# Patient Record
Sex: FEMALE | Race: WHITE | NOT HISPANIC OR LATINO | URBAN - METROPOLITAN AREA
[De-identification: names, ages, dates, MRNs, and addresses within clinical notes are randomized per-mention and may not be internally consistent; named-entity substitution may affect disease eponyms.]

---

## 2017-01-21 ENCOUNTER — INPATIENT (INPATIENT)
Facility: HOSPITAL | Age: 42
LOS: 1 days | Discharge: HOME | End: 2017-01-23
Attending: INTERNAL MEDICINE | Admitting: INTERNAL MEDICINE
Payer: MEDICARE

## 2017-01-21 DIAGNOSIS — N80.9 ENDOMETRIOSIS, UNSPECIFIED: Chronic | ICD-10-CM

## 2017-01-21 PROCEDURE — 71010: CPT | Mod: 26

## 2017-01-21 PROCEDURE — 99285 EMERGENCY DEPT VISIT HI MDM: CPT

## 2017-01-22 PROCEDURE — 93010 ELECTROCARDIOGRAM REPORT: CPT

## 2017-01-22 PROCEDURE — 70544 MR ANGIOGRAPHY HEAD W/O DYE: CPT | Mod: 26,59

## 2017-01-22 PROCEDURE — 70547 MR ANGIOGRAPHY NECK W/O DYE: CPT | Mod: 26

## 2017-01-22 PROCEDURE — 70450 CT HEAD/BRAIN W/O DYE: CPT | Mod: 26

## 2017-01-22 PROCEDURE — 70551 MRI BRAIN STEM W/O DYE: CPT | Mod: 26

## 2017-01-22 PROCEDURE — 99222 1ST HOSP IP/OBS MODERATE 55: CPT

## 2017-01-23 PROCEDURE — 93306 TTE W/DOPPLER COMPLETE: CPT | Mod: 26

## 2017-01-23 PROCEDURE — 93010 ELECTROCARDIOGRAM REPORT: CPT

## 2017-01-23 PROCEDURE — 99232 SBSQ HOSP IP/OBS MODERATE 35: CPT

## 2017-01-23 PROCEDURE — 95819 EEG AWAKE AND ASLEEP: CPT | Mod: 26

## 2017-01-24 ENCOUNTER — TRANSCRIPTION ENCOUNTER (OUTPATIENT)
Age: 42
End: 2017-01-24

## 2017-01-27 DIAGNOSIS — D69.6 THROMBOCYTOPENIA, UNSPECIFIED: ICD-10-CM

## 2017-01-27 DIAGNOSIS — R20.0 ANESTHESIA OF SKIN: ICD-10-CM
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
## 2017-01-27 DIAGNOSIS — T42.6X5A ADVERSE EFFECT OF OTHER ANTIEPILEPTIC AND SEDATIVE-HYPNOTIC DRUGS, INITIAL ENCOUNTER: ICD-10-CM

## 2017-01-27 DIAGNOSIS — G45.9 TRANSIENT CEREBRAL ISCHEMIC ATTACK, UNSPECIFIED: ICD-10-CM

## 2017-01-27 DIAGNOSIS — G43.109 MIGRAINE WITH AURA, NOT INTRACTABLE, WITHOUT STATUS MIGRAINOSUS: ICD-10-CM

## 2017-01-30 DIAGNOSIS — R20.2 PARESTHESIA OF SKIN: ICD-10-CM

## 2017-01-30 DIAGNOSIS — G93.9 DISORDER OF BRAIN, UNSPECIFIED: ICD-10-CM

## 2017-07-03 ENCOUNTER — TRANSCRIPTION ENCOUNTER (OUTPATIENT)
Age: 42
End: 2017-07-03

## 2017-07-03 ENCOUNTER — EMERGENCY (EMERGENCY)
Facility: HOSPITAL | Age: 42
LOS: 1 days | Discharge: HOME | End: 2017-07-03
Attending: EMERGENCY MEDICINE | Admitting: EMERGENCY MEDICINE
Payer: MEDICARE

## 2017-07-03 VITALS
HEART RATE: 71 BPM | OXYGEN SATURATION: 98 % | DIASTOLIC BLOOD PRESSURE: 78 MMHG | TEMPERATURE: 98 F | RESPIRATION RATE: 16 BRPM | SYSTOLIC BLOOD PRESSURE: 116 MMHG

## 2017-07-03 VITALS
HEIGHT: 65 IN | HEART RATE: 69 BPM | RESPIRATION RATE: 15 BRPM | OXYGEN SATURATION: 100 % | DIASTOLIC BLOOD PRESSURE: 78 MMHG | WEIGHT: 113.98 LBS | SYSTOLIC BLOOD PRESSURE: 109 MMHG | TEMPERATURE: 98 F

## 2017-07-03 DIAGNOSIS — N80.9 ENDOMETRIOSIS, UNSPECIFIED: Chronic | ICD-10-CM

## 2017-07-03 LAB
ALBUMIN SERPL ELPH-MCNC: 4 G/DL — SIGNIFICANT CHANGE UP (ref 3.3–5)
ALP SERPL-CCNC: 47 U/L — SIGNIFICANT CHANGE UP (ref 30–120)
ALT FLD-CCNC: 16 U/L DA — SIGNIFICANT CHANGE UP (ref 10–60)
ANION GAP SERPL CALC-SCNC: 8 MMOL/L — SIGNIFICANT CHANGE UP (ref 5–17)
APPEARANCE UR: CLEAR — SIGNIFICANT CHANGE UP
APTT BLD: 37.1 SEC — SIGNIFICANT CHANGE UP (ref 27.5–37.4)
AST SERPL-CCNC: 22 U/L — SIGNIFICANT CHANGE UP (ref 10–40)
BASOPHILS # BLD AUTO: 0 K/UL — SIGNIFICANT CHANGE UP (ref 0–0.2)
BASOPHILS NFR BLD AUTO: 1 % — SIGNIFICANT CHANGE UP (ref 0–2)
BILIRUB SERPL-MCNC: 0.5 MG/DL — SIGNIFICANT CHANGE UP (ref 0.2–1.2)
BILIRUB UR-MCNC: ABNORMAL
BUN SERPL-MCNC: 17 MG/DL — SIGNIFICANT CHANGE UP (ref 7–23)
CALCIUM SERPL-MCNC: 10 MG/DL — SIGNIFICANT CHANGE UP (ref 8.4–10.5)
CHLORIDE SERPL-SCNC: 103 MMOL/L — SIGNIFICANT CHANGE UP (ref 96–108)
CO2 SERPL-SCNC: 30 MMOL/L — SIGNIFICANT CHANGE UP (ref 22–31)
COLOR SPEC: YELLOW — SIGNIFICANT CHANGE UP
CREAT SERPL-MCNC: 0.68 MG/DL — SIGNIFICANT CHANGE UP (ref 0.5–1.3)
DIFF PNL FLD: ABNORMAL
EOSINOPHIL # BLD AUTO: 0 K/UL — SIGNIFICANT CHANGE UP (ref 0–0.5)
EOSINOPHIL NFR BLD AUTO: 0.5 % — SIGNIFICANT CHANGE UP (ref 0–6)
GLUCOSE SERPL-MCNC: 96 MG/DL — SIGNIFICANT CHANGE UP (ref 70–99)
GLUCOSE UR QL: NEGATIVE MG/DL — SIGNIFICANT CHANGE UP
HCG UR QL: NEGATIVE — SIGNIFICANT CHANGE UP
HCT VFR BLD CALC: 39.3 % — SIGNIFICANT CHANGE UP (ref 34.5–45)
HGB BLD-MCNC: 13.2 G/DL — SIGNIFICANT CHANGE UP (ref 11.5–15.5)
INR BLD: 1.18 RATIO — HIGH (ref 0.88–1.16)
KETONES UR-MCNC: ABNORMAL
LEUKOCYTE ESTERASE UR-ACNC: ABNORMAL
LIDOCAIN IGE QN: 186 U/L — SIGNIFICANT CHANGE UP (ref 73–393)
LYMPHOCYTES # BLD AUTO: 1.6 K/UL — SIGNIFICANT CHANGE UP (ref 1–3.3)
LYMPHOCYTES # BLD AUTO: 35.3 % — SIGNIFICANT CHANGE UP (ref 13–44)
MCHC RBC-ENTMCNC: 29.5 PG — SIGNIFICANT CHANGE UP (ref 27–34)
MCHC RBC-ENTMCNC: 33.6 GM/DL — SIGNIFICANT CHANGE UP (ref 32–36)
MCV RBC AUTO: 87.7 FL — SIGNIFICANT CHANGE UP (ref 80–100)
MONOCYTES # BLD AUTO: 0.5 K/UL — SIGNIFICANT CHANGE UP (ref 0–0.9)
MONOCYTES NFR BLD AUTO: 11.3 % — SIGNIFICANT CHANGE UP (ref 2–14)
NEUTROPHILS # BLD AUTO: 2.3 K/UL — SIGNIFICANT CHANGE UP (ref 1.8–7.4)
NEUTROPHILS NFR BLD AUTO: 51.8 % — SIGNIFICANT CHANGE UP (ref 43–77)
NITRITE UR-MCNC: POSITIVE
PH UR: 6 — SIGNIFICANT CHANGE UP (ref 5–8)
PLATELET # BLD AUTO: 105 K/UL — LOW (ref 150–400)
POTASSIUM SERPL-MCNC: 4.1 MMOL/L — SIGNIFICANT CHANGE UP (ref 3.5–5.3)
POTASSIUM SERPL-SCNC: 4.1 MMOL/L — SIGNIFICANT CHANGE UP (ref 3.5–5.3)
PROT SERPL-MCNC: 7.9 G/DL — SIGNIFICANT CHANGE UP (ref 6–8.3)
PROT UR-MCNC: 30 MG/DL
PROTHROM AB SERPL-ACNC: 12.9 SEC — HIGH (ref 9.8–12.7)
RBC # BLD: 4.49 M/UL — SIGNIFICANT CHANGE UP (ref 3.8–5.2)
RBC # FLD: 12.6 % — SIGNIFICANT CHANGE UP (ref 10.3–14.5)
SODIUM SERPL-SCNC: 141 MMOL/L — SIGNIFICANT CHANGE UP (ref 135–145)
SP GR SPEC: 1.02 — SIGNIFICANT CHANGE UP (ref 1.01–1.02)
UROBILINOGEN FLD QL: 1 MG/DL
WBC # BLD: 4.5 K/UL — SIGNIFICANT CHANGE UP (ref 3.8–10.5)
WBC # FLD AUTO: 4.5 K/UL — SIGNIFICANT CHANGE UP (ref 3.8–10.5)

## 2017-07-03 PROCEDURE — 81025 URINE PREGNANCY TEST: CPT

## 2017-07-03 PROCEDURE — 80053 COMPREHEN METABOLIC PANEL: CPT

## 2017-07-03 PROCEDURE — 99284 EMERGENCY DEPT VISIT MOD MDM: CPT | Mod: 25

## 2017-07-03 PROCEDURE — 85027 COMPLETE CBC AUTOMATED: CPT

## 2017-07-03 PROCEDURE — 99285 EMERGENCY DEPT VISIT HI MDM: CPT

## 2017-07-03 PROCEDURE — 36415 COLL VENOUS BLD VENIPUNCTURE: CPT

## 2017-07-03 PROCEDURE — 74177 CT ABD & PELVIS W/CONTRAST: CPT

## 2017-07-03 PROCEDURE — 85730 THROMBOPLASTIN TIME PARTIAL: CPT

## 2017-07-03 PROCEDURE — 74177 CT ABD & PELVIS W/CONTRAST: CPT | Mod: 26

## 2017-07-03 PROCEDURE — 85610 PROTHROMBIN TIME: CPT

## 2017-07-03 PROCEDURE — 81001 URINALYSIS AUTO W/SCOPE: CPT

## 2017-07-03 PROCEDURE — 83690 ASSAY OF LIPASE: CPT

## 2017-07-03 RX ORDER — AZTREONAM 2 G
1 VIAL (EA) INJECTION
Qty: 20 | Refills: 0
Start: 2017-07-03 | End: 2017-07-13

## 2017-07-03 RX ORDER — SODIUM CHLORIDE 9 MG/ML
3 INJECTION INTRAMUSCULAR; INTRAVENOUS; SUBCUTANEOUS ONCE
Qty: 0 | Refills: 0 | Status: COMPLETED | OUTPATIENT
Start: 2017-07-03 | End: 2017-07-03

## 2017-07-03 RX ORDER — IOHEXOL 300 MG/ML
30 INJECTION, SOLUTION INTRAVENOUS ONCE
Qty: 0 | Refills: 0 | Status: COMPLETED | OUTPATIENT
Start: 2017-07-03 | End: 2017-07-03

## 2017-07-03 RX ADMIN — IOHEXOL 30 MILLILITER(S): 300 INJECTION, SOLUTION INTRAVENOUS at 12:20

## 2017-07-03 RX ADMIN — SODIUM CHLORIDE 3 MILLILITER(S): 9 INJECTION INTRAMUSCULAR; INTRAVENOUS; SUBCUTANEOUS at 12:21

## 2017-07-03 RX ADMIN — Medication 1 TABLET(S): at 16:25

## 2017-07-03 NOTE — ED PROVIDER NOTE - OBJECTIVE STATEMENT
43 y/o F pt with history of ovarian cysts, endometriosis, fibromyalgia, epilepsy presents to the ED c/o intermittent abdominal pain and diarrhea since yesterday. Pt states after the diarrhea began, she also noticed rectal bleeding every time she goes to the bathroom. Pt went to Urgent Care and they sent her to ED. Pt has never had these symptoms before. Denies recent travel, sick contacts, nausea, vomiting, fever. No further complaints at this time.

## 2017-07-03 NOTE — ED ADULT TRIAGE NOTE - CHIEF COMPLAINT QUOTE
42 yr. old female c/o left lower abdominal pain since yesterday and rectal bleeding since yesterday.

## 2019-06-24 PROBLEM — Z01.419 VISIT FOR ROUTINE GYN EXAM: Status: ACTIVE | Noted: 2019-06-24

## 2019-06-25 ENCOUNTER — APPOINTMENT (OUTPATIENT)
Dept: OBGYN | Facility: CLINIC | Age: 44
End: 2019-06-25
Payer: MEDICARE

## 2019-06-25 ENCOUNTER — CLINICAL ADVICE (OUTPATIENT)
Age: 44
End: 2019-06-25

## 2019-06-25 VITALS
HEIGHT: 65.5 IN | SYSTOLIC BLOOD PRESSURE: 115 MMHG | BODY MASS INDEX: 19.16 KG/M2 | DIASTOLIC BLOOD PRESSURE: 70 MMHG | HEART RATE: 79 BPM | WEIGHT: 116.4 LBS

## 2019-06-25 DIAGNOSIS — Z01.419 ENCOUNTER FOR GYNECOLOGICAL EXAMINATION (GENERAL) (ROUTINE) W/OUT ABNORMAL FINDINGS: ICD-10-CM

## 2019-06-25 DIAGNOSIS — Z78.9 OTHER SPECIFIED HEALTH STATUS: ICD-10-CM

## 2019-06-25 DIAGNOSIS — R19.09 OTHER INTRA-ABDOMINAL AND PELVIC SWELLING, MASS AND LUMP: ICD-10-CM

## 2019-06-25 DIAGNOSIS — N95.2 POSTMENOPAUSAL ATROPHIC VAGINITIS: ICD-10-CM

## 2019-06-25 DIAGNOSIS — E28.319 ASYMPTOMATIC PREMATURE MENOPAUSE: ICD-10-CM

## 2019-06-25 PROCEDURE — G0101: CPT

## 2019-06-25 PROCEDURE — 99214 OFFICE O/P EST MOD 30 MIN: CPT | Mod: 25

## 2019-06-25 RX ORDER — AMOXICILLIN AND CLAVULANATE POTASSIUM 500; 125 MG/1; MG/1
500-125 TABLET, FILM COATED ORAL
Qty: 20 | Refills: 0 | Status: DISCONTINUED | COMMUNITY
Start: 2019-06-25 | End: 2019-06-25

## 2019-06-25 NOTE — PHYSICAL EXAM
[Awake] : awake [Alert] : alert [Soft] : soft [Oriented x3] : oriented to person, place, and time [Normal] : uterus [No Bleeding] : there was no active vaginal bleeding [Uterine Adnexae] : were not tender and not enlarged [Acute Distress] : no acute distress [Mass] : no breast mass [Axillary LAD] : no axillary lymphadenopathy [Nipple Discharge] : no nipple discharge [Tender] : non tender [de-identified] : right groin with erythematous firm tender mobile mass 2 x 4 cm [FreeTextEntry4] : narrow introitus

## 2019-06-27 LAB — HPV HIGH+LOW RISK DNA PNL CVX: NOT DETECTED

## 2019-07-02 LAB — CYTOLOGY CVX/VAG DOC THIN PREP: NORMAL

## 2019-07-06 ENCOUNTER — APPOINTMENT (OUTPATIENT)
Dept: MRI IMAGING | Facility: CLINIC | Age: 44
End: 2019-07-06

## 2019-07-06 ENCOUNTER — OUTPATIENT (OUTPATIENT)
Dept: OUTPATIENT SERVICES | Facility: HOSPITAL | Age: 44
LOS: 1 days | End: 2019-07-06
Payer: MEDICARE

## 2019-07-06 DIAGNOSIS — Z00.8 ENCOUNTER FOR OTHER GENERAL EXAMINATION: ICD-10-CM

## 2019-07-06 DIAGNOSIS — N80.9 ENDOMETRIOSIS, UNSPECIFIED: Chronic | ICD-10-CM

## 2019-07-06 PROCEDURE — 72197 MRI PELVIS W/O & W/DYE: CPT

## 2019-07-06 PROCEDURE — 72197 MRI PELVIS W/O & W/DYE: CPT | Mod: 26

## 2019-07-06 PROCEDURE — A9585: CPT

## 2019-07-16 ENCOUNTER — APPOINTMENT (OUTPATIENT)
Dept: GYNECOLOGIC ONCOLOGY | Facility: CLINIC | Age: 44
End: 2019-07-16
Payer: MEDICARE

## 2019-07-16 VITALS
BODY MASS INDEX: 18.99 KG/M2 | WEIGHT: 115.38 LBS | HEIGHT: 65.5 IN | HEART RATE: 67 BPM | SYSTOLIC BLOOD PRESSURE: 111 MMHG | DIASTOLIC BLOOD PRESSURE: 73 MMHG

## 2019-07-16 DIAGNOSIS — N94.89 OTHER SPECIFIED CONDITIONS ASSOCIATED WITH FEMALE GENITAL ORGANS AND MENSTRUAL CYCLE: ICD-10-CM

## 2019-07-16 PROCEDURE — 99203 OFFICE O/P NEW LOW 30 MIN: CPT

## 2019-07-16 NOTE — OB HISTORY
[Total Preg ___] : : [unfilled] [Living ___] : [unfilled] (living) [ ___] : [unfilled]  section delivery(s) [Menarche Age ____] : age at menarche was [unfilled] [Menopause  Age ____] : menopause occurred at age [unfilled]

## 2019-08-11 ENCOUNTER — EMERGENCY (EMERGENCY)
Facility: HOSPITAL | Age: 44
LOS: 0 days | Discharge: HOME | End: 2019-08-11
Attending: EMERGENCY MEDICINE
Payer: MEDICARE

## 2019-08-11 VITALS — WEIGHT: 134.92 LBS | HEIGHT: 63 IN

## 2019-08-11 VITALS
RESPIRATION RATE: 19 BRPM | TEMPERATURE: 98 F | OXYGEN SATURATION: 100 % | SYSTOLIC BLOOD PRESSURE: 134 MMHG | HEART RATE: 89 BPM | DIASTOLIC BLOOD PRESSURE: 81 MMHG

## 2019-08-11 DIAGNOSIS — N80.9 ENDOMETRIOSIS, UNSPECIFIED: Chronic | ICD-10-CM

## 2019-08-11 DIAGNOSIS — G89.29 OTHER CHRONIC PAIN: ICD-10-CM

## 2019-08-11 DIAGNOSIS — Z88.1 ALLERGY STATUS TO OTHER ANTIBIOTIC AGENTS STATUS: ICD-10-CM

## 2019-08-11 DIAGNOSIS — F12.920 CANNABIS USE, UNSPECIFIED WITH INTOXICATION, UNCOMPLICATED: ICD-10-CM

## 2019-08-11 DIAGNOSIS — R41.0 DISORIENTATION, UNSPECIFIED: ICD-10-CM

## 2019-08-11 DIAGNOSIS — F41.0 PANIC DISORDER [EPISODIC PAROXYSMAL ANXIETY]: ICD-10-CM

## 2019-08-11 DIAGNOSIS — M06.9 RHEUMATOID ARTHRITIS, UNSPECIFIED: ICD-10-CM

## 2019-08-11 DIAGNOSIS — G40.909 EPILEPSY, UNSPECIFIED, NOT INTRACTABLE, WITHOUT STATUS EPILEPTICUS: ICD-10-CM

## 2019-08-11 DIAGNOSIS — R11.10 VOMITING, UNSPECIFIED: ICD-10-CM

## 2019-08-11 LAB
ALBUMIN SERPL ELPH-MCNC: 3.7 G/DL — SIGNIFICANT CHANGE UP (ref 3.3–5)
ALP SERPL-CCNC: 45 U/L — SIGNIFICANT CHANGE UP (ref 40–120)
ALT FLD-CCNC: 24 U/L — SIGNIFICANT CHANGE UP (ref 12–78)
ANION GAP SERPL CALC-SCNC: 6 MMOL/L — SIGNIFICANT CHANGE UP (ref 5–17)
AST SERPL-CCNC: 42 U/L — HIGH (ref 15–37)
BASOPHILS # BLD AUTO: 0.03 K/UL — SIGNIFICANT CHANGE UP (ref 0–0.2)
BASOPHILS NFR BLD AUTO: 0.4 % — SIGNIFICANT CHANGE UP (ref 0–2)
BILIRUB SERPL-MCNC: 0.4 MG/DL — SIGNIFICANT CHANGE UP (ref 0.2–1.2)
BUN SERPL-MCNC: 14 MG/DL — SIGNIFICANT CHANGE UP (ref 7–23)
CALCIUM SERPL-MCNC: 10 MG/DL — SIGNIFICANT CHANGE UP (ref 8.5–10.1)
CHLORIDE SERPL-SCNC: 101 MMOL/L — SIGNIFICANT CHANGE UP (ref 96–108)
CK SERPL-CCNC: 66 U/L — SIGNIFICANT CHANGE UP (ref 26–192)
CO2 SERPL-SCNC: 29 MMOL/L — SIGNIFICANT CHANGE UP (ref 22–31)
CREAT SERPL-MCNC: 0.76 MG/DL — SIGNIFICANT CHANGE UP (ref 0.5–1.3)
EOSINOPHIL # BLD AUTO: 0.02 K/UL — SIGNIFICANT CHANGE UP (ref 0–0.5)
EOSINOPHIL NFR BLD AUTO: 0.3 % — SIGNIFICANT CHANGE UP (ref 0–6)
GLUCOSE SERPL-MCNC: 92 MG/DL — SIGNIFICANT CHANGE UP (ref 70–99)
HCT VFR BLD CALC: 39.6 % — SIGNIFICANT CHANGE UP (ref 34.5–45)
HGB BLD-MCNC: 13.4 G/DL — SIGNIFICANT CHANGE UP (ref 11.5–15.5)
IMM GRANULOCYTES NFR BLD AUTO: 0.4 % — SIGNIFICANT CHANGE UP (ref 0–1.5)
LYMPHOCYTES # BLD AUTO: 3.45 K/UL — HIGH (ref 1–3.3)
LYMPHOCYTES # BLD AUTO: 46.9 % — HIGH (ref 13–44)
MCHC RBC-ENTMCNC: 30.7 PG — SIGNIFICANT CHANGE UP (ref 27–34)
MCHC RBC-ENTMCNC: 33.8 GM/DL — SIGNIFICANT CHANGE UP (ref 32–36)
MCV RBC AUTO: 90.8 FL — SIGNIFICANT CHANGE UP (ref 80–100)
MONOCYTES # BLD AUTO: 0.88 K/UL — SIGNIFICANT CHANGE UP (ref 0–0.9)
MONOCYTES NFR BLD AUTO: 12 % — SIGNIFICANT CHANGE UP (ref 2–14)
NEUTROPHILS # BLD AUTO: 2.94 K/UL — SIGNIFICANT CHANGE UP (ref 1.8–7.4)
NEUTROPHILS NFR BLD AUTO: 40 % — LOW (ref 43–77)
PLATELET # BLD AUTO: 122 K/UL — LOW (ref 150–400)
POTASSIUM SERPL-MCNC: 3.5 MMOL/L — SIGNIFICANT CHANGE UP (ref 3.5–5.3)
POTASSIUM SERPL-SCNC: 3.5 MMOL/L — SIGNIFICANT CHANGE UP (ref 3.5–5.3)
PROT SERPL-MCNC: 7.9 GM/DL — SIGNIFICANT CHANGE UP (ref 6–8.3)
RBC # BLD: 4.36 M/UL — SIGNIFICANT CHANGE UP (ref 3.8–5.2)
RBC # FLD: 13.6 % — SIGNIFICANT CHANGE UP (ref 10.3–14.5)
SODIUM SERPL-SCNC: 136 MMOL/L — SIGNIFICANT CHANGE UP (ref 135–145)
TROPONIN I SERPL-MCNC: <0.015 NG/ML — SIGNIFICANT CHANGE UP (ref 0.01–0.04)
VALPROATE SERPL-MCNC: 102 UG/ML — HIGH (ref 50–100)
WBC # BLD: 7.35 K/UL — SIGNIFICANT CHANGE UP (ref 3.8–10.5)
WBC # FLD AUTO: 7.35 K/UL — SIGNIFICANT CHANGE UP (ref 3.8–10.5)

## 2019-08-11 PROCEDURE — 99284 EMERGENCY DEPT VISIT MOD MDM: CPT

## 2019-08-11 PROCEDURE — 36415 COLL VENOUS BLD VENIPUNCTURE: CPT

## 2019-08-11 PROCEDURE — 93005 ELECTROCARDIOGRAM TRACING: CPT

## 2019-08-11 PROCEDURE — 96375 TX/PRO/DX INJ NEW DRUG ADDON: CPT

## 2019-08-11 PROCEDURE — 82550 ASSAY OF CK (CPK): CPT

## 2019-08-11 PROCEDURE — 93010 ELECTROCARDIOGRAM REPORT: CPT

## 2019-08-11 PROCEDURE — 80164 ASSAY DIPROPYLACETIC ACD TOT: CPT

## 2019-08-11 PROCEDURE — 80053 COMPREHEN METABOLIC PANEL: CPT

## 2019-08-11 PROCEDURE — 85025 COMPLETE CBC W/AUTO DIFF WBC: CPT

## 2019-08-11 PROCEDURE — 99285 EMERGENCY DEPT VISIT HI MDM: CPT | Mod: 25

## 2019-08-11 PROCEDURE — 84484 ASSAY OF TROPONIN QUANT: CPT

## 2019-08-11 PROCEDURE — 96374 THER/PROPH/DIAG INJ IV PUSH: CPT

## 2019-08-11 RX ORDER — SODIUM CHLORIDE 9 MG/ML
1000 INJECTION INTRAMUSCULAR; INTRAVENOUS; SUBCUTANEOUS ONCE
Refills: 0 | Status: COMPLETED | OUTPATIENT
Start: 2019-08-11 | End: 2019-08-11

## 2019-08-11 RX ORDER — ONDANSETRON 8 MG/1
4 TABLET, FILM COATED ORAL ONCE
Refills: 0 | Status: COMPLETED | OUTPATIENT
Start: 2019-08-11 | End: 2019-08-11

## 2019-08-11 RX ORDER — DIVALPROEX SODIUM 500 MG/1
750 TABLET, DELAYED RELEASE ORAL ONCE
Refills: 0 | Status: COMPLETED | OUTPATIENT
Start: 2019-08-11 | End: 2019-08-11

## 2019-08-11 RX ADMIN — Medication 1 MILLIGRAM(S): at 00:40

## 2019-08-11 RX ADMIN — SODIUM CHLORIDE 1000 MILLILITER(S): 9 INJECTION INTRAMUSCULAR; INTRAVENOUS; SUBCUTANEOUS at 02:24

## 2019-08-11 RX ADMIN — DIVALPROEX SODIUM 750 MILLIGRAM(S): 500 TABLET, DELAYED RELEASE ORAL at 02:34

## 2019-08-11 RX ADMIN — SODIUM CHLORIDE 1000 MILLILITER(S): 9 INJECTION INTRAMUSCULAR; INTRAVENOUS; SUBCUTANEOUS at 00:41

## 2019-08-11 RX ADMIN — ONDANSETRON 4 MILLIGRAM(S): 8 TABLET, FILM COATED ORAL at 00:40

## 2019-08-11 NOTE — ED ADULT TRIAGE NOTE - CHIEF COMPLAINT QUOTE
pt presents to ED s/p seizure or panic attack. pt c/o throat closing, difficulty breathing. hx epilepsy

## 2019-08-11 NOTE — ED ADULT NURSE NOTE - MUSCULOSKELETAL WDL
Full range of motion of upper and lower extremities, no joint tenderness/swelling. Full range of motion of upper and lower extremities, no joint tenderness/swelling., pt with tremors

## 2019-08-11 NOTE — ED PROVIDER NOTE - OBJECTIVE STATEMENT
Pt. is a 43 yo F with a hx of RA, anxiety, mood disorder, scoliosis, seizures, endometriosis BIB  for palpitations, chest pain, panic attack after taking CBD for the first time recommended for her chronic pain.  Pt. states she cannot sit still and feels shaky and dizzy.  Pt. c/o chest tightness.  Pt. came to the ED for evaluation.  Pt. vomitted and does not feel well and did not take a dose of her depakote yet tonight.

## 2019-08-11 NOTE — ED ADULT NURSE NOTE - NSIMPLEMENTINTERV_GEN_ALL_ED
Implemented All Fall Risk Interventions:  [place] to call system. Call bell, personal items and telephone within reach. Instruct patient to call for assistance. Room bathroom lighting operational. Non-slip footwear when patient is off stretcher. Physically safe environment: no spills, clutter or unnecessary equipment. Stretcher in lowest position, wheels locked, appropriate side rails in place. Provide visual cue, wrist band, yellow gown, etc. Monitor gait and stability. Monitor for mental status changes and reorient to person, place, and time. Review medications for side effects contributing to fall risk. Reinforce activity limits and safety measures with patient and family.

## 2019-08-11 NOTE — ED PROVIDER NOTE - CONSTITUTIONAL, MLM
normal... Well appearing, well nourished, awake, alert, oriented to person, place, time/situation ; anxious and hyperventilating.

## 2019-08-11 NOTE — ED PROVIDER NOTE - PROGRESS NOTE DETAILS
Vitals improved and patient feeling better.  Wants to go home and go to sleep.  Will be discharged shortly with .

## 2019-08-11 NOTE — ED PROVIDER NOTE - CLINICAL SUMMARY MEDICAL DECISION MAKING FREE TEXT BOX
CBD intoxication and side effects from medication with panic attack.  labs, ekg normal and IVF and meds helped with symptoms.

## 2019-08-11 NOTE — ED ADULT NURSE NOTE - NSFALLRSKASSISTTYPE_ED_ALL_ED
Indication: Forehead injury following fall.



Multiple contiguous axial images obtained through the cervical spine.

Sagittal and coronal reformatted images obtained.



Comparison: May 29, 2009.



Several images slightly degraded by motion artifact even with repeated CT.  No

acute fracture, suspicious bone lesions, or spinal canal stenosis.  Stable

C3-C7 degenerative anterior endplate spurring.  Sagittal and coronal

reformatted images again demonstrates cervical lordotic straightening,

positional versus paraspinal spasm.  Vertebral body heights and disc spaces

maintained.  No acute compression fracture, subluxation, or jumped facet.

Normal appearing craniocervical junction.



Visualized noncontrasted soft tissues unremarkable.  Minimal right apical

fibrosis/scarring and dependent atelectasis.



Impression:

1.  Motion artifact.

2.  Stable multilevel degenerative endplate spurring and cervical lordotic

stranding, positional versus paraspinal spasm.

3.  Negative acute fracture/subluxation.



Comment: Preliminary interpretation was made by [facility].  No discrepancy.



CTDI 59.95
Indication: Forehead injury following fall.



Multiple contiguous axial images obtained through the head without contrast.



Comparison: April 14, 2017.



Study slightly degraded by motion artifact even with repeat CT.  Stable

age-appropriate global atrophy, minimal periventricular degenerative

micro-ischemia, and prominent ventricular system.  No acute intracranial

hemorrhage, abnormal extra-axial fluid question, or mass effect.  Fourth

ventricle is midline.  Bony calvarium intact.  Visualized paranasal sinuses

and mastoid air cells are clear.



Impression:

1.  Motion artifact.

2.  Stable nonacute senile brain with prominent ventricular system.



Comment: Preliminary interpretation was made by [facility].  No discrepancy.



CTDI 59.95
Walking/Toileting/Standing

## 2019-08-11 NOTE — ED ADULT NURSE NOTE - OBJECTIVE STATEMENT
pt reports smoking CBD tonight that a friend gave to her, pt with visual tremors on exam, pt reports chest pain, SOB, difficulty breathing, pt reports feeling extremely anxious and overall feeling bad, pt tachycardic on assessment, MD aware of vital signs, pt denies SI/Hi at this time

## 2019-09-03 ENCOUNTER — APPOINTMENT (OUTPATIENT)
Dept: GYNECOLOGIC ONCOLOGY | Facility: CLINIC | Age: 44
End: 2019-09-03
Payer: MEDICARE

## 2019-09-03 VITALS
WEIGHT: 116.13 LBS | BODY MASS INDEX: 19.12 KG/M2 | HEIGHT: 65.5 IN | DIASTOLIC BLOOD PRESSURE: 66 MMHG | SYSTOLIC BLOOD PRESSURE: 100 MMHG | HEART RATE: 68 BPM

## 2019-09-03 DIAGNOSIS — N90.89 OTHER SPECIFIED NONINFLAMMATORY DISORDERS OF VULVA AND PERINEUM: ICD-10-CM

## 2019-09-03 PROCEDURE — 99212 OFFICE O/P EST SF 10 MIN: CPT

## 2019-09-03 RX ORDER — DIVALPROEX SODIUM 250 MG/1
250 TABLET, DELAYED RELEASE ORAL
Qty: 270 | Refills: 0 | Status: ACTIVE | COMMUNITY
Start: 2019-06-20

## 2019-09-03 RX ORDER — DIVALPROEX SODIUM 250 MG/1
250 TABLET, EXTENDED RELEASE ORAL
Qty: 270 | Refills: 0 | Status: ACTIVE | COMMUNITY
Start: 2019-06-07

## 2019-09-30 ENCOUNTER — APPOINTMENT (OUTPATIENT)
Dept: PLASTIC SURGERY | Facility: CLINIC | Age: 44
End: 2019-09-30
Payer: MEDICARE

## 2019-09-30 DIAGNOSIS — T85.44XA CAPSULAR CONTRACTURE OF BREAST IMPLANT, INITIAL ENCOUNTER: ICD-10-CM

## 2019-09-30 PROCEDURE — 99204 OFFICE O/P NEW MOD 45 MIN: CPT

## 2019-10-02 PROBLEM — T85.44XA CAPSULAR CONTRACTURE OF BREAST IMPLANT, INITIAL ENCOUNTER: Status: ACTIVE | Noted: 2019-10-02

## 2019-10-02 NOTE — CONSULT LETTER
[Dear  ___] : Dear  [unfilled], [Consult Letter:] : I had the pleasure of evaluating your patient, [unfilled]. [Please see my note below.] : Please see my note below. [Sincerely,] : Sincerely, [DrChino  ___] : Dr. LLOYD [FreeTextEntry3] : Dani Santo MD, MS

## 2019-10-02 NOTE — ASSESSMENT
[FreeTextEntry1] : Interval resolution of right vulvar lesion from prior visit to Dr. Parisi. Patient was asked to return for re-evaluation of area should the lesion recur. No indication for intervention at this time. Would plan for punch biopsy at that time.\par \par We will need the ultrasound report from her prior ultrasound. With regard to her capsular contracture, will review her history and images to determine the best course of action. Given her paucity of soft tissue, multiple prior procedures, and capsular contracture, she is at elevated risk for a suboptimal aesthetic result in the future.\par \par \par

## 2019-10-02 NOTE — REVIEW OF SYSTEMS
[Arthralgias] : arthralgias [As Noted in HPI] : as noted in HPI [Fever] : no fever [Chills] : no chills [Loss Of Hearing] : no hearing loss [Shortness Of Breath] : no shortness of breath [Anxiety] : no anxiety [Depression] : no depression [FreeTextEntry5] : hx VSD [FreeTextEntry7] : che IBD [FreeTextEntry8] : frequent urination [de-identified] : last seizure 12 years ago [de-identified] : pt states she is undergoing menopause

## 2019-10-02 NOTE — HISTORY OF PRESENT ILLNESS
[FreeTextEntry1] : 45 y/o woman with a history of painful red lesions on the right vulva. She states that, when they appear, there is a chain of multiple confluent lesions. She is interested in having the area excised so they do not recur. She states that they never occur on the left side. She not certain if they represent hidradenitis or another problem. She is not currently sexually active and is not certain if she plans to be. She complains of pain with receptive vaginal intercourse. She is uncertain if she is capable of orgasm. She has had one child by .\par \par She also complains of right > left breast pain and deformity of her breast implants. She complains of a lack of sensation on the right breast. She states that she had prior imaging of the right side that demonstrated a "torn muscle." She had saline breast implants placed when she was 19 years old. These were replaced with silicone implants when she was in her 30s. She states she has had prior breast MRIs but is not certain when the last one was.\par \par PMH: epilepsy, rheumatoid arthritis (based on blood work, per pt), fibromyalgia, nystagmus\par PSH: breast augmentation, eye surgery, laparoscopy for endometriosis,  x 1\par All: erythromycin (uncertain of reaction, told by mother it happened when she was young)\par Meds: depakote\par Soc: denies nicotine/tobacco use, drinks 1-7 alcoholic drinks per week, denies recreational drug use \par Owns a spa, specializes in waxing.\par FH: Her son has an autism spectrum disorder and a g-tube.

## 2019-10-02 NOTE — PHYSICAL EXAM
04/01/18 1901   Provider Notification   Provider Name/Title Lauren Alvarez CNM   Method of Notification Electronic Page   Request Evaluate in Person   Notification Reason Patient Arrived   Pt here for r/o SROM. Amnisure sent. [id number]. No contractions   [de-identified] : NAD  [de-identified] : NC/AT [de-identified] : disconjugate gaze [de-identified] : hearing grossly normal [de-identified] : normal respiratory effort [de-identified] : scoliosis present, right shoulder higher than left, some appearance of pectus excavatum [de-identified] : prominent implants, some bottoming-out on left with tethering of inframammary scar, grade 3-4 capsular contracture on right, grade 1 on left. No palpable dominant masses, no skin changes, no axillary lymphadenopathy. Thin soft tissues. SN->N 18.5 cm B/L, width 14 cm on left, 13 cm on right, N->IMF 9 cm on left, 8.5 cm on right. [de-identified] : t [de-identified] : Vulva and mons without visible lesions. right labus major with 1 cm faded transverse scar. No palpable masses, no other skin changes. external inspection of midline vulvar structures reveals no abnormalities. [de-identified] : no significant visible lesions [de-identified] : normal affect, appropriate

## 2020-01-31 ENCOUNTER — APPOINTMENT (OUTPATIENT)
Dept: GYNECOLOGIC ONCOLOGY | Facility: CLINIC | Age: 45
End: 2020-01-31

## 2020-04-26 PROBLEM — N94.89 LABIAL SWELLING: Status: ACTIVE | Noted: 2019-06-25

## 2020-04-26 NOTE — LETTER BODY
Pt informed.    [Dear  ___] : Dear  [unfilled], [I had the pleasure of evaluating your patient, [unfilled] for ___] : I had the pleasure of evaluating your patient, [unfilled] for [unfilled]. [Attached please find my note.] : Attached please find my note.

## 2020-04-26 NOTE — DISCUSSION/SUMMARY
[Reviewed Clinical Lab Test(s)] : Results of clinical tests were reviewed. [Reviewed Radiology Report(s)] : Radiology reports were reviewed. [Discuss Alternatives/Risks/Benefits w/Patient] : All alternatives, risks, and benefits were discussed with the patient/family and all questions were answered.  Patient expressed good understanding and appreciates the importance of follow up as recommended. [Reviewed Radiology Film/Image(s)] : Images from radiology studies were reviewed and examined. [FreeTextEntry1] : \par   I sat with the patient to review her clinical history and imaging findings consistent with benign intermittent vulvar cellulitis.  Uncertain etiology of periodic re-infection, but documented improvement after antibiotic course & sitz bath.  May warrant future excision if becomes a persistent recurrent issue; I explained that soft tissue laxity at the right vulva is limited and would require plastics for closure of the defect.\par \par Return in six months for interval vulvar examination, return sooner p.r.n.

## 2020-04-26 NOTE — PHYSICAL EXAM
[Normal] : Bimanual Exam: Normal [de-identified] : compared to exam on 6/25, marked improvement - no erythema & slight vertical linear residual induration

## 2020-04-26 NOTE — CHIEF COMPLAINT
[FreeTextEntry1] : Referred by Dr. Scott for the evaluation and management of right vulva/groin mass

## 2020-04-26 NOTE — HISTORY OF PRESENT ILLNESS
[FreeTextEntry1] : 43 yo female G 1  P 1  is being referred by Dr. Scott for the evaluation and management of vulva/ groin mass\par \par Mass started 6 yrs ago. Hospitalized at [facility] 5 yrs ago; warranted antibiotic course. Unable to walk due to the pain. Currently describes mild pain.\par \par 6/25/19 Exam reveals a 3-4 cm (AP) x 2 cm (trans ) region of erythema and central firm induration of the lateral central right labia majora extending to the groin. **note in Allscript 6/25/19 **  Now status post completion of Keflex course.\par \par 7/6/19 MRI pelvis - Uterus measures 3.6 x 1.3 x 2.1 cm, with no myometrial mass. Lining is 2 mm. Atrophic ovaries. 1.4 cm cutaneous enhancing focus of the right labia majora is non specific. No right inguinal collection or mass.\par \par Seizure disorder- last seizure 2007

## 2021-04-20 ENCOUNTER — EMERGENCY (EMERGENCY)
Facility: HOSPITAL | Age: 46
LOS: 1 days | Discharge: HOME | End: 2021-04-20
Attending: EMERGENCY MEDICINE | Admitting: EMERGENCY MEDICINE
Payer: MEDICARE

## 2021-04-20 VITALS
TEMPERATURE: 100 F | SYSTOLIC BLOOD PRESSURE: 112 MMHG | RESPIRATION RATE: 16 BRPM | DIASTOLIC BLOOD PRESSURE: 72 MMHG | HEART RATE: 84 BPM | OXYGEN SATURATION: 99 %

## 2021-04-20 VITALS
DIASTOLIC BLOOD PRESSURE: 67 MMHG | SYSTOLIC BLOOD PRESSURE: 118 MMHG | TEMPERATURE: 98 F | OXYGEN SATURATION: 100 % | HEART RATE: 80 BPM | WEIGHT: 117.07 LBS | RESPIRATION RATE: 16 BRPM | HEIGHT: 63 IN

## 2021-04-20 DIAGNOSIS — N80.9 ENDOMETRIOSIS, UNSPECIFIED: Chronic | ICD-10-CM

## 2021-04-20 LAB
ALBUMIN SERPL ELPH-MCNC: 3.9 G/DL — SIGNIFICANT CHANGE UP (ref 3.3–5)
ALP SERPL-CCNC: 61 U/L — SIGNIFICANT CHANGE UP (ref 40–120)
ALT FLD-CCNC: 26 U/L — SIGNIFICANT CHANGE UP (ref 12–78)
ANION GAP SERPL CALC-SCNC: 5 MMOL/L — SIGNIFICANT CHANGE UP (ref 5–17)
APPEARANCE UR: CLEAR — SIGNIFICANT CHANGE UP
APTT BLD: 36.6 SEC — HIGH (ref 27.5–35.5)
AST SERPL-CCNC: 29 U/L — SIGNIFICANT CHANGE UP (ref 15–37)
BASOPHILS # BLD AUTO: 0.05 K/UL — SIGNIFICANT CHANGE UP (ref 0–0.2)
BASOPHILS NFR BLD AUTO: 1 % — SIGNIFICANT CHANGE UP (ref 0–2)
BILIRUB SERPL-MCNC: 0.4 MG/DL — SIGNIFICANT CHANGE UP (ref 0.2–1.2)
BILIRUB UR-MCNC: NEGATIVE — SIGNIFICANT CHANGE UP
BUN SERPL-MCNC: 12 MG/DL — SIGNIFICANT CHANGE UP (ref 7–23)
CALCIUM SERPL-MCNC: 10.1 MG/DL — SIGNIFICANT CHANGE UP (ref 8.5–10.1)
CHLORIDE SERPL-SCNC: 103 MMOL/L — SIGNIFICANT CHANGE UP (ref 96–108)
CO2 SERPL-SCNC: 29 MMOL/L — SIGNIFICANT CHANGE UP (ref 22–31)
COLOR SPEC: YELLOW — SIGNIFICANT CHANGE UP
CREAT SERPL-MCNC: 0.57 MG/DL — SIGNIFICANT CHANGE UP (ref 0.5–1.3)
DIFF PNL FLD: NEGATIVE — SIGNIFICANT CHANGE UP
EOSINOPHIL # BLD AUTO: 0.05 K/UL — SIGNIFICANT CHANGE UP (ref 0–0.5)
EOSINOPHIL NFR BLD AUTO: 1 % — SIGNIFICANT CHANGE UP (ref 0–6)
GLUCOSE SERPL-MCNC: 87 MG/DL — SIGNIFICANT CHANGE UP (ref 70–99)
GLUCOSE UR QL: NEGATIVE — SIGNIFICANT CHANGE UP
HCG UR QL: NEGATIVE — SIGNIFICANT CHANGE UP
HCT VFR BLD CALC: 43.8 % — SIGNIFICANT CHANGE UP (ref 34.5–45)
HGB BLD-MCNC: 14.5 G/DL — SIGNIFICANT CHANGE UP (ref 11.5–15.5)
INR BLD: 1.06 RATIO — SIGNIFICANT CHANGE UP (ref 0.88–1.16)
KETONES UR-MCNC: NEGATIVE — SIGNIFICANT CHANGE UP
LEUKOCYTE ESTERASE UR-ACNC: ABNORMAL
LIDOCAIN IGE QN: 162 U/L — SIGNIFICANT CHANGE UP (ref 73–393)
LYMPHOCYTES # BLD AUTO: 1.7 K/UL — SIGNIFICANT CHANGE UP (ref 1–3.3)
LYMPHOCYTES # BLD AUTO: 35 % — SIGNIFICANT CHANGE UP (ref 13–44)
MCHC RBC-ENTMCNC: 29.8 PG — SIGNIFICANT CHANGE UP (ref 27–34)
MCHC RBC-ENTMCNC: 33.1 GM/DL — SIGNIFICANT CHANGE UP (ref 32–36)
MCV RBC AUTO: 89.9 FL — SIGNIFICANT CHANGE UP (ref 80–100)
MONOCYTES # BLD AUTO: 0.63 K/UL — SIGNIFICANT CHANGE UP (ref 0–0.9)
MONOCYTES NFR BLD AUTO: 13 % — SIGNIFICANT CHANGE UP (ref 2–14)
NEUTROPHILS # BLD AUTO: 2.43 K/UL — SIGNIFICANT CHANGE UP (ref 1.8–7.4)
NEUTROPHILS NFR BLD AUTO: 50 % — SIGNIFICANT CHANGE UP (ref 43–77)
NITRITE UR-MCNC: NEGATIVE — SIGNIFICANT CHANGE UP
NRBC # BLD: SIGNIFICANT CHANGE UP /100 WBCS (ref 0–0)
PH UR: 8 — SIGNIFICANT CHANGE UP (ref 5–8)
PLATELET # BLD AUTO: 123 K/UL — LOW (ref 150–400)
POTASSIUM SERPL-MCNC: 4.1 MMOL/L — SIGNIFICANT CHANGE UP (ref 3.5–5.3)
POTASSIUM SERPL-SCNC: 4.1 MMOL/L — SIGNIFICANT CHANGE UP (ref 3.5–5.3)
PROT SERPL-MCNC: 9.1 G/DL — HIGH (ref 6–8.3)
PROT UR-MCNC: NEGATIVE — SIGNIFICANT CHANGE UP
PROTHROM AB SERPL-ACNC: 12.4 SEC — SIGNIFICANT CHANGE UP (ref 10.6–13.6)
RBC # BLD: 4.87 M/UL — SIGNIFICANT CHANGE UP (ref 3.8–5.2)
RBC # FLD: 13.5 % — SIGNIFICANT CHANGE UP (ref 10.3–14.5)
SARS-COV-2 RNA SPEC QL NAA+PROBE: SIGNIFICANT CHANGE UP
SODIUM SERPL-SCNC: 137 MMOL/L — SIGNIFICANT CHANGE UP (ref 135–145)
SP GR SPEC: 1.01 — SIGNIFICANT CHANGE UP (ref 1.01–1.02)
UROBILINOGEN FLD QL: NEGATIVE — SIGNIFICANT CHANGE UP
WBC # BLD: 4.85 K/UL — SIGNIFICANT CHANGE UP (ref 3.8–10.5)
WBC # FLD AUTO: 4.85 K/UL — SIGNIFICANT CHANGE UP (ref 3.8–10.5)

## 2021-04-20 PROCEDURE — 74177 CT ABD & PELVIS W/CONTRAST: CPT | Mod: 26,ME

## 2021-04-20 PROCEDURE — 81001 URINALYSIS AUTO W/SCOPE: CPT

## 2021-04-20 PROCEDURE — 85025 COMPLETE CBC W/AUTO DIFF WBC: CPT

## 2021-04-20 PROCEDURE — 99285 EMERGENCY DEPT VISIT HI MDM: CPT | Mod: CS,GC

## 2021-04-20 PROCEDURE — 96374 THER/PROPH/DIAG INJ IV PUSH: CPT | Mod: XU

## 2021-04-20 PROCEDURE — 36415 COLL VENOUS BLD VENIPUNCTURE: CPT

## 2021-04-20 PROCEDURE — 76705 ECHO EXAM OF ABDOMEN: CPT | Mod: 26

## 2021-04-20 PROCEDURE — 93010 ELECTROCARDIOGRAM REPORT: CPT

## 2021-04-20 PROCEDURE — 93005 ELECTROCARDIOGRAM TRACING: CPT

## 2021-04-20 PROCEDURE — 85730 THROMBOPLASTIN TIME PARTIAL: CPT

## 2021-04-20 PROCEDURE — 87635 SARS-COV-2 COVID-19 AMP PRB: CPT

## 2021-04-20 PROCEDURE — 85610 PROTHROMBIN TIME: CPT

## 2021-04-20 PROCEDURE — 76705 ECHO EXAM OF ABDOMEN: CPT

## 2021-04-20 PROCEDURE — 81025 URINE PREGNANCY TEST: CPT

## 2021-04-20 PROCEDURE — 83690 ASSAY OF LIPASE: CPT

## 2021-04-20 PROCEDURE — G1004: CPT

## 2021-04-20 PROCEDURE — 74177 CT ABD & PELVIS W/CONTRAST: CPT

## 2021-04-20 PROCEDURE — 99283 EMERGENCY DEPT VISIT LOW MDM: CPT

## 2021-04-20 PROCEDURE — 99284 EMERGENCY DEPT VISIT MOD MDM: CPT | Mod: 25

## 2021-04-20 PROCEDURE — 80053 COMPREHEN METABOLIC PANEL: CPT

## 2021-04-20 RX ORDER — KETOROLAC TROMETHAMINE 30 MG/ML
30 SYRINGE (ML) INJECTION ONCE
Refills: 0 | Status: DISCONTINUED | OUTPATIENT
Start: 2021-04-20 | End: 2021-04-20

## 2021-04-20 RX ORDER — METRONIDAZOLE 500 MG
1 TABLET ORAL
Qty: 21 | Refills: 0
Start: 2021-04-20 | End: 2021-04-26

## 2021-04-20 RX ORDER — SODIUM CHLORIDE 9 MG/ML
1000 INJECTION INTRAMUSCULAR; INTRAVENOUS; SUBCUTANEOUS ONCE
Refills: 0 | Status: COMPLETED | OUTPATIENT
Start: 2021-04-20 | End: 2021-04-20

## 2021-04-20 RX ORDER — ONDANSETRON 8 MG/1
4 TABLET, FILM COATED ORAL ONCE
Refills: 0 | Status: COMPLETED | OUTPATIENT
Start: 2021-04-20 | End: 2021-04-20

## 2021-04-20 RX ADMIN — SODIUM CHLORIDE 1000 MILLILITER(S): 9 INJECTION INTRAMUSCULAR; INTRAVENOUS; SUBCUTANEOUS at 08:56

## 2021-04-20 RX ADMIN — ONDANSETRON 4 MILLIGRAM(S): 8 TABLET, FILM COATED ORAL at 08:56

## 2021-04-20 NOTE — ED ADULT NURSE NOTE - PRO INTERPRETER NEED 2
"Interval HPI:   Overnight events: remains in ICU, intubated. NAEON. BG at or slightly above goal with prn correction scale.   Eating:   NPO - diet advancing  Diet Dysphagia Pureed (IDDSI Level 4) [facility]; Thin  Nausea: No  Hypoglycemia and intervention: No  Fever: No  TF: peptamen intense VHP at 35 cc/hr today (goal 55 cc/hr per RD).     BP (!) 82/0 (BP Location: Right arm, Patient Position: Lying)   Pulse 90   Temp 99.5 °F (37.5 °C) (Oral)   Resp (!) 21   Ht 5' 10" (1.778 m)   Wt 106.2 kg (234 lb 2.1 oz)   SpO2 100%   BMI 33.59 kg/m²     Labs Reviewed and Include    Recent Labs   Lab 01/31/20  0435 01/31/20  1140   *  --    CALCIUM 8.8  --    ALBUMIN 2.3*  2.3*  --    PROT 6.1  6.1  --    *  --    K 4.2 3.9   CO2 23  --    *  --    BUN 77*  --    CREATININE 2.2*  --    ALKPHOS 47*  47*  --    ALT 14  14  --    AST 36  36  --    BILITOT 0.8  0.8  --      Lab Results   Component Value Date    WBC 17.56 (H) 01/31/2020    HGB 9.6 (L) 01/31/2020    HCT 26 (L) 01/31/2020    MCV 94 01/31/2020     01/31/2020     No results for input(s): TSH, FREET4 in the last 168 hours.  Lab Results   Component Value Date    HGBA1C 6.2 (H) 01/15/2020       Nutritional status:   Body mass index is 33.59 kg/m².  Lab Results   Component Value Date    ALBUMIN 2.3 (L) 01/31/2020    ALBUMIN 2.3 (L) 01/31/2020    ALBUMIN 2.5 (L) 01/30/2020     Lab Results   Component Value Date    PREALBUMIN 11 (L) 01/31/2020    PREALBUMIN 11 (L) 01/24/2020    PREALBUMIN 27 01/15/2020       Estimated Creatinine Clearance: 44.1 mL/min (A) (based on SCr of 2.2 mg/dL (H)).    Accu-Checks  Recent Labs     01/29/20  1134 01/29/20  1630 01/30/20  0009 01/30/20  0505 01/30/20  0752 01/30/20  1215 01/30/20  1828 01/30/20  2204 01/31/20  0438 01/31/20  1141   POCTGLUCOSE 170* 143* 149* 139* 105 155* 164* 199* 125* 215*       Current Medications and/or Treatments Impacting Glycemic Control  Immunotherapy:  "   Immunosuppressants     None        Steroids:   Hormones (From admission, onward)    None        Pressors:    Autonomic Drugs (From admission, onward)    Start     Stop Route Frequency Ordered    01/29/20 0745  EPINEPHrine (ADRENALIN) 5 mg in dextrose 5 % 250 mL infusion     Question Answer Comment   Titrate by: (in mcg/kg/min) 0.02    Titrate interval: (in minutes) 5    Titrate to maintain: (SBP or MAP or Cardiac Index) MAP    Greater than: (in mmHg) 60    Maximum dose: (in mcg/kg/min) 2        -- IV Continuous 01/29/20 0743        Hyperglycemia/Diabetes Medications:   Antihyperglycemics (From admission, onward)    Start     Stop Route Frequency Ordered    01/30/20 1319  insulin aspart U-100 pen 0-5 Units      -- SubQ Every 6 hours PRN 01/30/20 1219         English

## 2021-04-20 NOTE — CONSULT NOTE ADULT - ASSESSMENT
47 yo female with acute brannon and edematous gall bladder too inflamed for cholecystitis at this time   will discharge with augmentin and flagyl  and follow up as outpatient

## 2021-04-20 NOTE — ED PROVIDER NOTE - ATTENDING CONTRIBUTION TO CARE
47yo F with epilepsy, RA, fibromyalgia sent to ED to r/o gallstones, pt with ruq pain assoc with nausea and vomit x 1 since 2am, rad to back, no fever, chills, cp, sob, trauma, cough, palpitations.  pmd= dung [place]  Gen: Awake, Alert, WD, WN, NAD  Head:  NC/AT  Eyes:  PERRL, EOMI, Conjunctiva pink, lids normal, no scleral icterus  ENT: OP clear, no exudates, moist mucus membranes  Neck: supple, nontender, trachea midline  Cardiac/CV:  S1 S2, RRR, no M/G/R  Respiratory/Pulm:  CTAB, good air movement, normal resp effort  Gastrointestinal/Abdomen:  Soft, +tender RUQ,, nondistended, +BS, no rebound/guarding  Back:  no CVAT, no MLT  Ext:  warm, well perfused, moving all extremities spontaneously, no peripheral edema, distal pulses intact  Skin: intact, no rash  Neuro:  AAOx3, sensation intact, motor 5/5 x 4 extremities, speech clear  labs, Ultrasound GB, pain meds, biliary colic/acute cholecystitis

## 2021-04-20 NOTE — ED PROVIDER NOTE - PROGRESS NOTE DETAILS
Surgery advised dc with Augmentin and flagyl. advised patient will follow up in office. pt given copy of CT, discussed nodule findings and importance for further assessment. Reevaluated patient at bedside. Patient notes improvement of symptoms. Discussed results with the patient and provided copies.  All questions were answered. Discussed the importance of prompt, close medical follow-up. Patient will return with any changes, concerns or persistent/worsening symptoms.  Patient verbalized understanding. Discussed temp of 100.2F with surgery, advised may still dc patient and will follow up in office.

## 2021-04-20 NOTE — CONSULT NOTE ADULT - SUBJECTIVE AND OBJECTIVE BOX
GENERAL SURGERY CONSULT NOTE    Patient is a 46y old  Female who presents with a chief complaint of abdominal pain     HPI: 45 yo female with sudden abdominal pain since 2 AM presented to the ED with a crushing'/ squeezing pain  radiating to back , patient with such pain x1 a month ago which disappeared without any intervention. patient with nausea and x1 vomiting denies any other complaints. no bowel change , sob, cp recent travel has been reported     < from: CT Abdomen and Pelvis w/ IV Cont (21 @ 11:10) >  OWER CHEST: Bilateral breast implants are again seen. Small bilateral atelectasis. A few small bilateral lung nodules measuring up to 4 mm in the right lower lobe (image 8 series 2); if the patient is in the high risk category (i.e. smoker), follow-up chest CT may be pursued in 12 months to ensure stability.    LIVER: Tiny hypodense lesion in the left hepatic lobe, too small to characterize.  BILE DUCTS: Not dilated.  GALLBLADDER: New gallbladder wall thickening measuring 1.0 cm in thickness. Gallstones noted on abdominal ultrasound of the same day is not visualized at CT. If there is a clinical suspicion for acute cholecystitis, HIDA scan may be pursued for further evaluation.  SPLEEN: Within normal limits.  PANCREAS: Within normal limits.  ADRENALS: Within normal limits.  KIDNEYS/URETERS: Within normal limits.    BLADDER: Within normal limits.  REPRODUCTIVE ORGANS: The uterus and adnexa appear grossly unremarkable.    BOWEL: No bowel obstruction or grossly thickened bowel. Appendix within normal limits.  PERITONEUM: No ascites. No free air.  VESSELS: Within normal limits.  RETROPERITONEUM/LYMPH NODES: No lymphadenopathy.  ABDOMINAL WALL: Within normal limits.  BONES: Mild degenerative spondylosis. Grade 1 retrolisthesis at L5-S1    IMPRESSION:  Gallbladder wall thickening. Gallstones noted on abdominal ultrasound of the same day is not visualized at CT. If there is a clinical suspicion for acute cholecystitis, HIDA scan may be pursued for further evaluation.    A few small bilateral lung nodules; if the patient is in the high risk category (i.e. smoker), follow-up chest CT may be pursued in 12 months to ensure stability.            KARRIE DUMONT MD; Attending Radiologist  This document has been electronically signed. 2021 11:41AM    < end of copied text >    PAST MEDICAL & SURGICAL HISTORY:  Seizure disorder    Other form of scoliosis    Fibromyalgia    Endometriosis determined by laparoscopy        Review of Systems:    I have reviewed 9 systems with the patient and the only positive findings were     MEDICATIONS  (STANDING):    MEDICATIONS  (PRN):      Allergies    erythromycin (Rash)    Intolerances        SOCIAL HISTORY          Smoking: Yes [ ]  No [ x]   ______pk yrs          ETOH  Yes [ ]  No [ ]  Social [x ]          DRUGS:  Yes [ ]  No [x ]  if so what______________    FAMILY HISTORY:  No pertinent family history in first degree relatives        Vital Signs Last 24 Hrs  T(C): 36.7 (2021 11:40), Max: 36.7 (2021 11:40)  T(F): 98 (2021 11:40), Max: 98 (2021 11:40)  HR: 80 (2021 11:40) (80 - 80)  BP: 105/73 (2021 11:40) (105/73 - 118/67)  BP(mean): --  RR: 16 (2021 11:40) (16 - 16)  SpO2: 100% (2021 11:40) (100% - 100%)    Physical Exam:    General:  Appears stated age, well-groomed, well-nourished, no distress  Eyes : SKIP  HENT:  WNL, no JVD  Chest:  clear breath sounds good insiratory effort   Cardiovascular:  Regular rate & rhythm  Abdomen:  soft with mild tenderness, RUQ +ve BS  Extremities:  no edema , good capillary refill   Skin:  No rash  Musculoskeletal:  normal strength  Neuro/Psych:  Alert, oriented tp time, place and person       LABS:                        14.5   4.85  )-----------( 123      ( 2021 09:02 )             43.8     04-20    137  |  103  |  12  ----------------------------<  87  4.1   |  29  |  0.57    Ca    10.1      2021 09:02    TPro  9.1<H>  /  Alb  3.9  /  TBili  0.4  /  DBili  x   /  AST  29  /  ALT  26  /  AlkPhos  61  04-20    PT/INR - ( 2021 09:02 )   PT: 12.4 sec;   INR: 1.06 ratio         PTT - ( 2021 09:02 )  PTT:36.6 sec  Urinalysis Basic - ( 2021 09:02 )    Color: Yellow / Appearance: Clear / S.010 / pH: x  Gluc: x / Ketone: Negative  / Bili: Negative / Urobili: Negative   Blood: x / Protein: Negative / Nitrite: Negative   Leuk Esterase: Trace / RBC: 0-2 /HPF / WBC 0-2   Sq Epi: x / Non Sq Epi: Negative / Bacteria: x        RADIOLOGY & ADDITIONAL STUDIES:    Risks, benefits, and alternatives to treatment discussed. All questions answered with understanding.

## 2021-04-20 NOTE — ED ADULT NURSE NOTE - PMH
Other form of scoliosis    Seizure disorder     Fibromyalgia    Other form of scoliosis    Seizure disorder

## 2021-04-20 NOTE — ED PROVIDER NOTE - NSFOLLOWUPINSTRUCTIONS_ED_ALL_ED_FT
Follow up with your primary care doctor and surgeon. Return for increased pain, fever, vomiting, etc. Antibiotics were sent to your pharmacy.    Many things can cause abdominal pain. Many times, abdominal pain is not caused by a disease and will improve without treatment. Your health care provider will do a physical exam to determine if there is a dangerous cause of your pain; blood tests and imaging may help determine the cause of your pain. However, in many cases, no cause may be found and you may need further testing as an outpatient. Monitor your abdominal pain for any changes.     SEEK IMMEDIATE MEDICAL CARE IF YOU HAVE ANY OF THE FOLLOWING SYMPTOMS: worsening abdominal pain, uncontrollable vomiting, profuse diarrhea, inability to have bowel movements or pass gas, black or bloody stools, fever accompanying chest pain or back pain, or fainting. These symptoms may represent a serious problem that is an emergency. Do not wait to see if the symptoms will go away. Get medical help right away. Call 911 and do not drive yourself to the hospital.         Gallstones    WHAT YOU NEED TO KNOW:    Gallstones are hard substances that form in your gallbladder or bile duct. Your gallbladder and bile duct are located on the right side of your abdomen, near your liver. Your gallbladder stores bile. Bile helps break down the fat that you eat. Your gallbladder also helps remove certain chemicals from your body.    Gallstones         DISCHARGE INSTRUCTIONS:    Seek care immediately if:   •You have a fever and chills.      •Your skin or eyes turn yellow.      •You have severe pain in your upper abdomen, just below the right ribcage.      Contact your healthcare provider if:   •You have nausea and are vomiting.      •Your urine is dark.      •You have varsha-colored bowel movements.      •You have questions or concerns about your condition or care.      Medicines:   •Prescription pain medicine may be given. Ask your healthcare provider how to take this medicine safely.      •Take your medicine as directed. Contact your healthcare provider if you think your medicine is not helping or if you have side effects. Tell him or her if you are allergic to any medicine. Keep a list of the medicines, vitamins, and herbs you take. Include the amounts, and when and why you take them. Bring the list or the pill bottles to follow-up visits. Carry your medicine list with you in case of an emergency.      What you can do to manage or prevent gallstones:   •Eat a variety of healthy foods. This may help you have more energy and heal faster. Healthy foods include fruits, vegetables, whole-grain breads, low-fat dairy products, beans, lean meat, and fish. Ask if you need to be on a special diet. Try to eat regular meals during the day. This will help your gallbladder empty.      •Exercise as directed. Talk to your healthcare provider about the best exercise plan for you. Exercise can help you lose weight and improve your health.      •Manage your weight. If you are overweight, it is important to reach a healthy weight. You will need to lose weight slowly because rapid weight loss can increase your risk for gallstones. Talk to your healthcare provider about your weight. He or she can help you create a safe weight loss plan if you need to lose weight.      Follow up with your healthcare provider as directed: Write down your questions so you remember to ask them during your visits.

## 2021-04-20 NOTE — ED PROVIDER NOTE - CARE PROVIDER_API CALL
Yazan Aponte)  Surgery  [address] [place] [address]  Phone: [phone]  Fax: [phone]  Follow Up Time: 1-3 Days

## 2021-04-20 NOTE — ED ADULT NURSE NOTE - EXTENSIONS OF SELF_ADULT
Start time: 11:27am       End time: 12:48pm  Patient has given verbal consent for video visit? Yes  Originating location (pt location):patient's assisted living facility  Additional participants in remote visit: none  Distant location (provider location): [facility] center  Mode of communication (Audio Visual or Audio only): audio  Person's response or participation level: active      Service Provided:  Wellness Check:   Medication: no change   Meals/Food: prepared by assisted living staff   Home Nursing: on staff    ADL needs/Bathing: assisted by staff   Homemaking tasks: done by staff   Recent falls: none   Change in health condition: none  Is there any safety concerns at this time:   Current mental health status: good    Exercise: She is still riding her stationary bike.    Socialization:    Check-in: Patient is talking to her friend Rani about different brands of electric toothbrushes, as she needs to replace hers. We did a cognitive activity where she imagined that she was taking her friendsEaston and Rani on a picnic to [facility] in [place].  She chose the location, invited them and packed a virtual picnic basket with smoked salmon, boursin cheese, onion, cucumber, fresh dill, eric, and lemon served on flat bread.  On the side, she would serve berries and seven layer blondie bars.  For drinks, she would pack a variety of la croix sparkling water cans.      Cognition:  Same as before stay at home order.            Eyeglasses

## 2021-04-20 NOTE — ED PROVIDER NOTE - OBJECTIVE STATEMENT
45 y/o female with PMHx Fibromyalgia, Fibroids, Endometriosis, Rheumatoid Arthritis, and Epilepsy sent by PCP Ricardo Olivares due to epigastric abdominal pain since 2am today. pt reports pain was severe, radiating to back, cramping, and currently 8/10. pt reports she vomited once. pt was advised to come to ED to rule out gallstones. pt denies melena, hematochezia, dysuria, hematuria, chest pain, SOB, fever, bilious vomit, hematemesis, or any other complaints.

## 2021-04-20 NOTE — ED PROVIDER NOTE - PATIENT PORTAL LINK FT
You can access the FollowMyHealth Patient Portal offered by [facility] by registering at the following website: [email or web address]. By joining [employer]’s FollowMyHealth portal, you will also be able to view your health information using other applications (apps) compatible with our system.

## 2021-04-20 NOTE — ED PROVIDER NOTE - CLINICAL SUMMARY MEDICAL DECISION MAKING FREE TEXT BOX
sent by PCP Ricardo Olivares due to epigastric abdominal pain since 2am today. pt reports pain was severe, radiating to back. pt reports she vomited once. pt was advised to come to ED to rule out gallstones. Plan includes labs, lipase r/o pancreatitis, UA r/o UTI, urine pregnancy, US r/o cholelithiasis/cholecystitis, re-assess

## 2021-04-20 NOTE — ED ADULT TRIAGE NOTE - CHIEF COMPLAINT QUOTE
Patient is a 45yo female complaining of intense abdominal pain across upper abdominal I episode of vomiting. Patient states that he pain started last night and was told by her PCP to come to the ED for rule out of cholecystics

## 2021-04-20 NOTE — ED ADULT NURSE NOTE - CAS EDN DISCHARGE ASSESSMENT
Problem: Falls - Risk of  Goal: *Absence of Falls  Document Sherrie Fall Risk and appropriate interventions in the flowsheet.    Outcome: Progressing Towards Goal  Fall Risk Interventions:  Mobility Interventions: Patient to call before getting OOB    Mentation Interventions: Bed/chair exit alarm, More frequent rounding    Medication Interventions: Patient to call before getting OOB, Teach patient to arise slowly    Elimination Interventions: Call light in reach, Patient to call for help with toileting needs    History of Falls Interventions: Bed/chair exit alarm Alert and oriented to person, place and time

## 2021-04-20 NOTE — CONSULT NOTE ADULT - ATTENDING COMMENTS
At time of visiting the patient her pain was minimal and significantly improved from presentation.   No signs of jaundice.  Labs unremarkable.  Sono and CT both personally reviewed.  Small gallstone vs adenomyomatosis of GB.  Significant wall thickening.  Negative Solano's sign.  Findings suspicious for Chronic Cholecystitis.    Given the extent of wall thickening, this would benefit from deferred/interval cholecystectomy once acute inflammatory process resolves.    Recommend discharge to home on Oral antibiotics.  f/u with me in 2-3 weeks to discuss elective cholecystectomy.  Return sooner should symptoms recur or worsen.

## 2021-04-20 NOTE — ED ADULT NURSE REASSESSMENT NOTE - NS ED NURSE REASSESS COMMENT FT1
pt refusing pain meds at this time "when I really need it ill ask for it" I told her not to wait that long as the pain might not go away if she waits too long.

## 2021-04-20 NOTE — ED ADULT NURSE NOTE - OBJECTIVE STATEMENT
pt with sudden onset of abdominal pain with nausea and vomiting at 0200 . pt with generalized abdominal pain more on the upper abdomen. pt denies diarrhea or fevers. called pmd and was told to come in here.

## 2021-04-20 NOTE — ED PROVIDER NOTE - PHYSICAL EXAMINATION

## 2021-04-20 NOTE — ED ADULT NURSE REASSESSMENT NOTE - NS ED NURSE REASSESS COMMENT FT1
Patient with 100.2F oral temp. Patient offered Tylenol but stated she preferred to go home and take it at home. MD Langston made aware.

## 2021-04-21 ENCOUNTER — NON-APPOINTMENT (OUTPATIENT)
Age: 46
End: 2021-04-21

## 2021-04-21 PROBLEM — M79.7 FIBROMYALGIA: Chronic | Status: ACTIVE | Noted: 2021-04-20

## 2021-04-30 ENCOUNTER — APPOINTMENT (OUTPATIENT)
Dept: SURGERY | Facility: CLINIC | Age: 46
End: 2021-04-30
Payer: MEDICARE

## 2021-04-30 ENCOUNTER — NON-APPOINTMENT (OUTPATIENT)
Age: 46
End: 2021-04-30

## 2021-04-30 VITALS
BODY MASS INDEX: 18.6 KG/M2 | DIASTOLIC BLOOD PRESSURE: 84 MMHG | SYSTOLIC BLOOD PRESSURE: 124 MMHG | HEART RATE: 77 BPM | TEMPERATURE: 96.4 F | HEIGHT: 65.5 IN | OXYGEN SATURATION: 100 % | WEIGHT: 113 LBS

## 2021-04-30 DIAGNOSIS — R56.9 UNSPECIFIED CONVULSIONS: ICD-10-CM

## 2021-04-30 PROCEDURE — 99215 OFFICE O/P EST HI 40 MIN: CPT

## 2021-04-30 RX ORDER — CEPHALEXIN 500 MG/1
500 CAPSULE ORAL 4 TIMES DAILY
Qty: 40 | Refills: 0 | Status: DISCONTINUED | COMMUNITY
Start: 2019-06-25 | End: 2021-04-30

## 2021-04-30 NOTE — CONSULT LETTER
[Dear  ___] : Dear  [unfilled], [Referral Letter:] : I am referring [unfilled] to you for further evaluation.  My most recent evaluation follows. [Please see my note below.] : Please see my note below. [Referral Closing:] : Thank you very much for seeing this patient.  If you have any questions, please do not hesitate to contact me. [Sincerely,] : Sincerely, [FreeTextEntry3] : Yazan Aponte MD FACS FASMBS\par Advanced Laparoscopic, General & Bariatric Surgery\par Chair, Dept of Surgery, [facility]\par [facility] Physician [facility]\par [address] [place] [address]\[place] [address]\par P: [phone]\par F: [phone]\par

## 2021-04-30 NOTE — ASSESSMENT
[FreeTextEntry1] : Chronic cholecystitis.  Sono with marked wall thickening.  Labs relatively unremarkable.\par Recommended interval cholecystectomy.\par Rx for repeat blood work provided.\par \par Discussed Laparoscopic Cholecystectomy.\par Risk, Benefits, and Alternatives to surgery have been discussed.  This includes but is not limited to bleeding, infection, damage to adjacent structures, need for additional surgery or interventions, adverse effects of anesthesia such as cardio-respiratory complications, prolonged intubation, cardiac arrhythmia, arrest, and or death.  Risks of forgoing surgery have also been discussed including progression of, and/or worsening of current condition which may then require urgent or emergent treatment or surgery.\par \par Educational materials courtesy of the [employer] with respect to cholecystectomy have been provided for the patient's review and all questions have been answered.\par \par Neurology clearance for hx of epilepsy.  - Dr Kwame Rodrigues\par \par Will schedule electively for Lap Cholecystectomy.

## 2021-04-30 NOTE — HISTORY OF PRESENT ILLNESS
[de-identified] : Seen in ED on 4/20 with RUQ pain.  \par Findings consistent with Chronic choleycstitis.  Normal WBC and LFT's\par GB wall thickening, no stones on CT and Sono.\par D/C'd home with antibiotics for outpatient follow up and plans for interval cholecystectomy.

## 2021-04-30 NOTE — PHYSICAL EXAM
[Normal Breath Sounds] : Normal breath sounds [Normal Heart Sounds] : normal heart sounds [Normal Rate and Rhythm] : normal rate and rhythm [No Rash or Lesion] : No rash or lesion [Alert] : alert [Oriented to Person] : oriented to person [Oriented to Place] : oriented to place [Oriented to Time] : oriented to time [Calm] : calm [de-identified] : Healhty apperaing woman in no distress [de-identified] : Strabismus (?)  Corrective glasses.  No scleral icterus or jaundice [de-identified] : b/l implants [de-identified] : Soft, nontender nondistended, positive bowel sounds in all four quads.  No hernia or masses. No rebound or guarding.\par Negative Solano's sign [de-identified] : Ambulating without difficulty or assistance.

## 2021-05-11 ENCOUNTER — OUTPATIENT (OUTPATIENT)
Dept: OUTPATIENT SERVICES | Facility: HOSPITAL | Age: 46
LOS: 1 days | End: 2021-05-11
Payer: MEDICARE

## 2021-05-11 VITALS
HEART RATE: 76 BPM | SYSTOLIC BLOOD PRESSURE: 120 MMHG | HEIGHT: 65 IN | TEMPERATURE: 98 F | WEIGHT: 115.08 LBS | DIASTOLIC BLOOD PRESSURE: 77 MMHG | OXYGEN SATURATION: 98 % | RESPIRATION RATE: 16 BRPM

## 2021-05-11 DIAGNOSIS — K80.20 CALCULUS OF GALLBLADDER WITHOUT CHOLECYSTITIS WITHOUT OBSTRUCTION: ICD-10-CM

## 2021-05-11 DIAGNOSIS — N80.9 ENDOMETRIOSIS, UNSPECIFIED: Chronic | ICD-10-CM

## 2021-05-11 DIAGNOSIS — K80.10 CALCULUS OF GALLBLADDER WITH CHRONIC CHOLECYSTITIS WITHOUT OBSTRUCTION: ICD-10-CM

## 2021-05-11 DIAGNOSIS — Z01.818 ENCOUNTER FOR OTHER PREPROCEDURAL EXAMINATION: ICD-10-CM

## 2021-05-11 DIAGNOSIS — Z98.82 BREAST IMPLANT STATUS: Chronic | ICD-10-CM

## 2021-05-11 LAB — HCG SERPL-ACNC: 3 MIU/ML — SIGNIFICANT CHANGE UP

## 2021-05-11 PROCEDURE — 86850 RBC ANTIBODY SCREEN: CPT

## 2021-05-11 PROCEDURE — 84702 CHORIONIC GONADOTROPIN TEST: CPT

## 2021-05-11 PROCEDURE — 86900 BLOOD TYPING SEROLOGIC ABO: CPT

## 2021-05-11 PROCEDURE — 86901 BLOOD TYPING SEROLOGIC RH(D): CPT

## 2021-05-11 PROCEDURE — 36415 COLL VENOUS BLD VENIPUNCTURE: CPT

## 2021-05-11 PROCEDURE — G0463: CPT

## 2021-05-11 NOTE — H&P PST ADULT - GENERAL COMMENTS
Had Dec 2020; Fully vaccinated; Denies recent international travel, recent illness and sick contact with COVID in the last 3 weeks

## 2021-05-11 NOTE — H&P PST ADULT - NSICDXPROBLEM_GEN_ALL_CORE_FT
PROBLEM DIAGNOSES  Problem: Calculus of gallbladder with chronic cholecystitis without obstruction  Assessment and Plan: scheduled for a laparoscopic cholecystectomy on 5/17 with Dr. Aponte    Problem: Pre-op evaluation  Assessment and Plan: Labs  - CBC, CMP, PT/PTT, EKG on chart from 4/20. HCG nd T&S pending. COVID PCR 5/14    Neuro clearance requested by surgeon  Pre op and Hibiclens instructions reviewed and given. Continue rotuine meds at night. Instructed to hold and/or avoid other NSAIDs and OTC supplements. Tylenol is ok. Verbalized understanding

## 2021-05-11 NOTE — H&P PST ADULT - NSICDXPASTSURGICALHX_GEN_ALL_CORE_FT
PAST SURGICAL HISTORY:  Endometriosis determined by laparoscopy     H/O breast augmentation Twice - First with Saline then with Silicone

## 2021-05-11 NOTE — H&P PST ADULT - HISTORY OF PRESENT ILLNESS
45 yo female with pmh of epilepsy, stable on Depakote. Last seizure activity in 2007. Presents to PST scheduled for a Laprosocpic cholecystectomy on 5/17 with Dr. Hopper. C/O acute abd pain with vomiting. Seen by PCP and referred to the ED on 4/20. Gallstones noted on ultrasound  and thickening seen on CT.  Denies pain today.

## 2021-05-11 NOTE — H&P PST ADULT - NSICDXPASTMEDICALHX_GEN_ALL_CORE_FT
PAST MEDICAL HISTORY:  Calculus of gallbladder with chronic cholecystitis without obstruction     Fibromyalgia     Other form of scoliosis     Seizure disorder

## 2021-05-14 ENCOUNTER — OUTPATIENT (OUTPATIENT)
Dept: OUTPATIENT SERVICES | Facility: HOSPITAL | Age: 46
LOS: 1 days | End: 2021-05-14
Payer: MEDICARE

## 2021-05-14 DIAGNOSIS — N80.9 ENDOMETRIOSIS, UNSPECIFIED: Chronic | ICD-10-CM

## 2021-05-14 DIAGNOSIS — Z20.828 CONTACT WITH AND (SUSPECTED) EXPOSURE TO OTHER VIRAL COMMUNICABLE DISEASES: ICD-10-CM

## 2021-05-14 DIAGNOSIS — Z98.82 BREAST IMPLANT STATUS: Chronic | ICD-10-CM

## 2021-05-14 LAB — SARS-COV-2 RNA SPEC QL NAA+PROBE: SIGNIFICANT CHANGE UP

## 2021-05-14 NOTE — ASU PATIENT PROFILE, ADULT - PMH
Calculus of gallbladder with chronic cholecystitis without obstruction    Fibromyalgia    Other form of scoliosis    Seizure disorder

## 2021-05-14 NOTE — ASU PATIENT PROFILE, ADULT - PSH
Endometriosis determined by laparoscopy    H/O breast augmentation  Twice - First with Saline then with Silicone

## 2021-05-16 ENCOUNTER — TRANSCRIPTION ENCOUNTER (OUTPATIENT)
Age: 46
End: 2021-05-16

## 2021-05-17 ENCOUNTER — RESULT REVIEW (OUTPATIENT)
Age: 46
End: 2021-05-17

## 2021-05-17 ENCOUNTER — OUTPATIENT (OUTPATIENT)
Dept: OUTPATIENT SERVICES | Facility: HOSPITAL | Age: 46
LOS: 1 days | End: 2021-05-17
Payer: MEDICARE

## 2021-05-17 ENCOUNTER — APPOINTMENT (OUTPATIENT)
Dept: SURGERY | Facility: HOSPITAL | Age: 46
End: 2021-05-17

## 2021-05-17 VITALS
HEIGHT: 65 IN | HEART RATE: 83 BPM | DIASTOLIC BLOOD PRESSURE: 78 MMHG | TEMPERATURE: 98 F | WEIGHT: 115.08 LBS | SYSTOLIC BLOOD PRESSURE: 161 MMHG | OXYGEN SATURATION: 100 % | RESPIRATION RATE: 14 BRPM

## 2021-05-17 VITALS
RESPIRATION RATE: 13 BRPM | HEART RATE: 61 BPM | OXYGEN SATURATION: 100 % | DIASTOLIC BLOOD PRESSURE: 60 MMHG | SYSTOLIC BLOOD PRESSURE: 110 MMHG

## 2021-05-17 DIAGNOSIS — K80.20 CALCULUS OF GALLBLADDER WITHOUT CHOLECYSTITIS WITHOUT OBSTRUCTION: ICD-10-CM

## 2021-05-17 DIAGNOSIS — N80.9 ENDOMETRIOSIS, UNSPECIFIED: Chronic | ICD-10-CM

## 2021-05-17 DIAGNOSIS — Z98.82 BREAST IMPLANT STATUS: Chronic | ICD-10-CM

## 2021-05-17 PROCEDURE — 47562 LAPAROSCOPIC CHOLECYSTECTOMY: CPT | Mod: AS

## 2021-05-17 PROCEDURE — 88304 TISSUE EXAM BY PATHOLOGIST: CPT

## 2021-05-17 PROCEDURE — 47562 LAPAROSCOPIC CHOLECYSTECTOMY: CPT

## 2021-05-17 PROCEDURE — C1889: CPT

## 2021-05-17 PROCEDURE — 88304 TISSUE EXAM BY PATHOLOGIST: CPT | Mod: 26

## 2021-05-17 RX ORDER — OXYCODONE AND ACETAMINOPHEN 5; 325 MG/1; MG/1
1 TABLET ORAL
Qty: 10 | Refills: 0
Start: 2021-05-17

## 2021-05-17 RX ORDER — METOCLOPRAMIDE HCL 10 MG
5 TABLET ORAL ONCE
Refills: 0 | Status: DISCONTINUED | OUTPATIENT
Start: 2021-05-17 | End: 2021-05-17

## 2021-05-17 RX ORDER — HYDROMORPHONE HYDROCHLORIDE 2 MG/ML
0.5 INJECTION INTRAMUSCULAR; INTRAVENOUS; SUBCUTANEOUS
Refills: 0 | Status: DISCONTINUED | OUTPATIENT
Start: 2021-05-17 | End: 2021-05-17

## 2021-05-17 RX ORDER — SODIUM CHLORIDE 9 MG/ML
1000 INJECTION, SOLUTION INTRAVENOUS
Refills: 0 | Status: DISCONTINUED | OUTPATIENT
Start: 2021-05-17 | End: 2021-05-17

## 2021-05-17 RX ORDER — APREPITANT 80 MG/1
40 CAPSULE ORAL ONCE
Refills: 0 | Status: COMPLETED | OUTPATIENT
Start: 2021-05-17 | End: 2021-05-17

## 2021-05-17 RX ORDER — CEFAZOLIN SODIUM 1 G
2000 VIAL (EA) INJECTION ONCE
Refills: 0 | Status: COMPLETED | OUTPATIENT
Start: 2021-05-17 | End: 2021-05-17

## 2021-05-17 RX ORDER — OXYCODONE HYDROCHLORIDE 5 MG/1
5 TABLET ORAL ONCE
Refills: 0 | Status: DISCONTINUED | OUTPATIENT
Start: 2021-05-17 | End: 2021-05-17

## 2021-05-17 RX ORDER — DIVALPROEX SODIUM 500 MG/1
3 TABLET, DELAYED RELEASE ORAL
Qty: 0 | Refills: 0 | DISCHARGE

## 2021-05-17 RX ADMIN — APREPITANT 40 MILLIGRAM(S): 80 CAPSULE ORAL at 11:49

## 2021-05-17 RX ADMIN — SODIUM CHLORIDE 75 MILLILITER(S): 9 INJECTION, SOLUTION INTRAVENOUS at 10:42

## 2021-05-17 NOTE — BRIEF OPERATIVE NOTE - NSICDXBRIEFPREOP_GEN_ALL_CORE_FT
PRE-OP DIAGNOSIS:  Calculus of gallbladder without cholecystitis 17-May-2021 13:10:24  Leatha Samano

## 2021-05-17 NOTE — ASU DISCHARGE PLAN (ADULT/PEDIATRIC) - ASU DC SPECIAL INSTRUCTIONSFT
Follow-up with Dr. Aponte in 1 week  Keep steri-strips clean, dry and intact x 24 hrs. Apply water proof ice pack 20 mins on, 20 mins off to help decrease pain and swelling. You may begin showering as usual but Do NOT remove steri-strips. Do not scrub or soak incision site. Pat dry. NO tub baths, NO swimming pools. No hot tubs.  Do not lift anything over 10 lbs.  Take frequent walks.  Take over the counter Tylenol or Motrin/Ibuprofen as needed for mild/moderate pain. DO NOT take tylenol while taking percocet.  Take Percocet as prescribed for severe pain.  DO NOT DRIVE WHILE TAKING NARCOTIC PAIN MEDICATION.

## 2021-05-17 NOTE — ASU DISCHARGE PLAN (ADULT/PEDIATRIC) - CARE PROVIDER_API CALL
Yazan Aponte)  Surgery  [address] [place] [address]  Phone: [phone]  Fax: [phone]  Follow Up Time:

## 2021-05-17 NOTE — BRIEF OPERATIVE NOTE - NSICDXBRIEFPOSTOP_GEN_ALL_CORE_FT
POST-OP DIAGNOSIS:  Calculus of gallbladder without cholecystitis 17-May-2021 13:10:52  Leatha Samano

## 2021-05-18 PROBLEM — K80.10 CALCULUS OF GALLBLADDER WITH CHRONIC CHOLECYSTITIS WITHOUT OBSTRUCTION: Chronic | Status: ACTIVE | Noted: 2021-05-11

## 2021-05-25 ENCOUNTER — APPOINTMENT (OUTPATIENT)
Dept: SURGERY | Facility: CLINIC | Age: 46
End: 2021-05-25
Payer: MEDICARE

## 2021-05-25 DIAGNOSIS — Z87.19 PERSONAL HISTORY OF OTHER DISEASES OF THE DIGESTIVE SYSTEM: ICD-10-CM

## 2021-05-25 PROCEDURE — 99024 POSTOP FOLLOW-UP VISIT: CPT

## 2021-05-25 NOTE — PHYSICAL EXAM
[Normal Breath Sounds] : Normal breath sounds [Normal Heart Sounds] : normal heart sounds [Normal Rate and Rhythm] : normal rate and rhythm [No Rash or Lesion] : No rash or lesion [Alert] : alert [Oriented to Person] : oriented to person [Oriented to Place] : oriented to place [Oriented to Time] : oriented to time [Calm] : calm [de-identified] : Healhty apperaing woman in no distress [de-identified] : Strabismus (?)  Corrective glasses.  No scleral icterus or jaundice [de-identified] : b/l implants [de-identified] : Soft, nontender nondistended, positive bowel sounds in all four quads.  No hernia or masses. No rebound or guarding.\par Negative Solano's sign\par Surgical incisions well approximated and healing nicely.  No signs of infection. [de-identified] : Ambulating without difficulty or assistance.

## 2021-05-25 NOTE — ASSESSMENT
[FreeTextEntry1] : s/p Lap Demi\par Unremarkable post operative course\par Path reviewed, consisted with chronic cholecystitis and cholelithiasis.\par \par Postoperative instructions have been provided including avoidance of heavy lifting and strenuous activities in excess of 20-25 pounds for duration of 4-6 weeks. The patient may shower keeping the incisions clean, dry, covered as needed.\par \par f/u PRN

## 2021-09-30 ENCOUNTER — APPOINTMENT (OUTPATIENT)
Dept: GYNECOLOGIC ONCOLOGY | Facility: CLINIC | Age: 46
End: 2021-09-30
Payer: MEDICARE

## 2021-09-30 VITALS — WEIGHT: 115 LBS | HEIGHT: 64.5 IN | BODY MASS INDEX: 19.39 KG/M2

## 2021-09-30 DIAGNOSIS — R10.2 PELVIC AND PERINEAL PAIN: ICD-10-CM

## 2021-09-30 DIAGNOSIS — R00.2 PALPITATIONS: ICD-10-CM

## 2021-09-30 DIAGNOSIS — N76.2 ACUTE VULVITIS: ICD-10-CM

## 2021-09-30 DIAGNOSIS — Z13.820 ENCOUNTER FOR SCREENING FOR OSTEOPOROSIS: ICD-10-CM

## 2021-09-30 PROCEDURE — 99214 OFFICE O/P EST MOD 30 MIN: CPT | Mod: 25

## 2021-10-01 ENCOUNTER — NON-APPOINTMENT (OUTPATIENT)
Age: 46
End: 2021-10-01

## 2021-10-01 LAB — HPV HIGH+LOW RISK DNA PNL CVX: NOT DETECTED

## 2021-10-05 LAB — HPV HIGH+LOW RISK DNA PNL CVX: NOT DETECTED

## 2021-10-22 ENCOUNTER — NON-APPOINTMENT (OUTPATIENT)
Age: 46
End: 2021-10-22

## 2021-10-22 ENCOUNTER — APPOINTMENT (OUTPATIENT)
Dept: ORTHOPEDIC SURGERY | Facility: CLINIC | Age: 46
End: 2021-10-22
Payer: MEDICARE

## 2021-10-22 DIAGNOSIS — M79.645 PAIN IN LEFT FINGER(S): ICD-10-CM

## 2021-10-22 PROCEDURE — 99204 OFFICE O/P NEW MOD 45 MIN: CPT

## 2021-10-22 RX ORDER — ONDANSETRON 4 MG/1
4 TABLET ORAL 3 TIMES DAILY
Qty: 40 | Refills: 0 | Status: ACTIVE | COMMUNITY
Start: 2021-10-22 | End: 1900-01-01

## 2021-10-22 RX ORDER — MELOXICAM 15 MG/1
15 TABLET ORAL
Qty: 14 | Refills: 0 | Status: ACTIVE | COMMUNITY
Start: 2021-10-22 | End: 1900-01-01

## 2021-10-22 NOTE — ASSESSMENT
[FreeTextEntry1] : 46-year-old female status post sprain of the left finger PIP joint. I recommend danica with immobilization with active range of motion as tolerated. I recommend anti-inflammatory medications, followup in one week for range of motion check.

## 2021-10-22 NOTE — HISTORY OF PRESENT ILLNESS
[FreeTextEntry1] : patient is a 46-year-old female who presents for evaluation of left ring finger pain swelling and stiffness. She sustained an injury recently to the ring finger when she jammed it and subsequently developed pain and stiffness at the PIP joint. She has been wearing a digital splint, which helps with the pain.

## 2021-10-22 NOTE — PHYSICAL EXAM
[Normal] : Oriented to person, place, and time, insight and judgement were intact and the affect was normal [de-identified] : left ring finger: \par skin intact moderate swelling mild ecchymosis at the PIP joint no erythema no gross deformity\par Tenderness to palpation most severe at the PIP joint\par Active flexion at the PIP and PIP joints intact, range of motion limited secondary to pain and stiffness, finger tip to palm distance of 5 cm\par neurovascular intact distally [de-identified] : 3 x-ray views of the left ring finger are reviewed, no fracture of dislocation

## 2021-10-26 LAB — CYTOLOGY CVX/VAG DOC THIN PREP: ABNORMAL

## 2021-11-08 ENCOUNTER — APPOINTMENT (OUTPATIENT)
Dept: ORTHOPEDIC SURGERY | Facility: CLINIC | Age: 46
End: 2021-11-08
Payer: MEDICARE

## 2021-11-08 DIAGNOSIS — S63.635A SPRAIN OF INTERPHALANGEAL JOINT OF LEFT RING FINGER, INITIAL ENCOUNTER: ICD-10-CM

## 2021-11-08 PROCEDURE — 99213 OFFICE O/P EST LOW 20 MIN: CPT

## 2021-12-30 NOTE — PHYSICAL EXAM
[Normal] : Oriented to person, place, and time, insight and judgement were intact and the affect was normal [de-identified] : left ring finger: \par skin intact no swelling, no ecchymosis,no erythema no gross deformity\par no TTP. \par Active flexion at the PIP and PIP joints intact, range of motion intact, patient able to make a composite fist. \par neurovascular intact distally

## 2021-12-30 NOTE — ASSESSMENT
[FreeTextEntry1] : 46-year-old female status post sprain of the left finger PIP joint, now healed and improved. \par Patient has been recommended to return to activity as tolerated, and will follow up on an as needed basis.

## 2022-03-24 NOTE — H&P PST ADULT - NS SC CAGE ALCOHOL EYE OPENER
Discharge summary    Date of admission 3/21/2022  Date of discharge 3/24/2022    Final diagnosis  Perdomo's esophagus  Gastritis  Internal hemorrhoids  Gastroesophageal reflux disease    Discharge medications    Current Facility-Administered Medications:   •  pantoprazole (PROTONIX) EC tablet 40 mg, 40 mg, Oral, BID Magy BENNETT Aftab, MD, 40 mg at 03/24/22 0632     Consult obtained  Gastroenterology    Procedures  Upper and lower endoscopy which showed better esophagus gastritis and internal hemorrhoids    Hospital course  43-year-old Persian male with history of gastroesophageal disease admitted through emergency room with abdominal pain for 2 months associated with nausea weight loss and no appetite.  Patient evaluated in ER and admitted for further work-up and further evaluated by gastroenterology and recommend endoscopy.  Patient underwent endoscopy which revealed esophagus gastritis and internal hemorrhoids.  Patient biopsy came back negative and GI recommend continue Protonix twice daily and they will follow as an outpatient.  Patient tolerating diet and cleared for discharge.    Discharge diet regular    Activity as tolerated    Medication as above    Follow-up with prime doctor in 1 week and follow-up with gastroenterology per the instruction and take medication as directed    GAYLE DACOSTA MD  
no

## 2022-04-06 ENCOUNTER — APPOINTMENT (OUTPATIENT)
Dept: ORTHOPEDIC SURGERY | Facility: CLINIC | Age: 47
End: 2022-04-06

## 2022-04-26 ENCOUNTER — APPOINTMENT (OUTPATIENT)
Dept: SURGERY | Facility: CLINIC | Age: 47
End: 2022-04-26
Payer: MEDICARE

## 2022-04-26 DIAGNOSIS — R19.00 INTRA-ABDOMINAL AND PELVIC SWELLING, MASS AND LUMP, UNSPECIFIED SITE: ICD-10-CM

## 2022-04-26 DIAGNOSIS — R10.13 EPIGASTRIC PAIN: ICD-10-CM

## 2022-04-26 PROCEDURE — 99214 OFFICE O/P EST MOD 30 MIN: CPT

## 2022-04-26 NOTE — PHYSICAL EXAM
[Normal Breath Sounds] : Normal breath sounds [Normal Heart Sounds] : normal heart sounds [Normal Rate and Rhythm] : normal rate and rhythm [No Rash or Lesion] : No rash or lesion [Alert] : alert [Oriented to Person] : oriented to person [Oriented to Place] : oriented to place [Oriented to Time] : oriented to time [Calm] : calm [de-identified] : Healthy appearing woman in no distress [de-identified] : Strabismus (?)  Corrective glasses.  No scleral icterus or jaundice [de-identified] : b/l implants [de-identified] : Soft, nontender nondistended, positive bowel sounds in all four quads.  No obvious hernia. No rebound or guarding.\par Negative Solano's sign\par Surgical incisions consistent with cholecystectomy appear well healed without obvious hernia.  Bulge in midline epigastrium, no cough impulse or change with valsalva or abdominal crunches. [de-identified] : Ambulating without difficulty or assistance.

## 2022-04-26 NOTE — ASSESSMENT
[FreeTextEntry1] : Unusual fullness in mid epigastrium.  Doubt herniation from 5 mm epigastric trocar site.\par Not painful to palpation but fusiform in appearance and firm to rubbery in consistency.\par To better assess I've suggested CT abdomen.  Rx provided.\par Follow up once CT complete\par Signs and symptoms of incarceration/strangulation/obstruction have been reviewed with the patient.  Should such symptoms present, urgent medical attention should be sought.  Contact my office immediately and or head to your nearest emergency room for evaluation and treatment.  This may require immediate surgical repair.\par \par

## 2022-04-26 NOTE — HISTORY OF PRESENT ILLNESS
[de-identified] : Known to me from lap cholecystectomy approximately one year ago.  Returns today with complaints of bulge and abdominal pain in mid epigastrium.\par

## 2022-05-02 ENCOUNTER — APPOINTMENT (OUTPATIENT)
Dept: CT IMAGING | Facility: CLINIC | Age: 47
End: 2022-05-02

## 2022-12-15 NOTE — H&P PST ADULT - CLICK TO LAUNCH ORM
Suturegard Body: The suture ends were repeatedly re-tightened and re-clamped to achieve the desired tissue expansion. .

## 2023-01-04 NOTE — ED PROVIDER NOTE - ATTENDING WITH...
[facility] Physician - Brief Progress Note  PERMANENT  01/03/2023 22:17    [facility] - [facility] [place] ([facility])    BRIAN MUNIZ    Date of Service 01/03/2023 22:17    HPI/Events of Note RN reported patient having Temp 101  Blood cultures done on 12/30 negative and patient currently on Vancomycin and Zosyn  RN has noticed increased secretions on suctioning (Patient on vent support)  Has CXR ordered for AM - I have suggested to sent sputum for culture and continue current IV antibiotics  Not receiving Tylenol because of underlying Cirrhosis. I have suggested to use cooling blanket if necessary      Interventions Intermediate-Communication with other healthcare providers and/or family, Other: Fever        Electronically Signed by: Cuauhtemoc Campos) on 01/03/2023 22:20   ACP

## 2024-02-09 ENCOUNTER — EMERGENCY (EMERGENCY)
Facility: HOSPITAL | Age: 49
LOS: 1 days | Discharge: HOME | End: 2024-02-09
Attending: STUDENT IN AN ORGANIZED HEALTH CARE EDUCATION/TRAINING PROGRAM | Admitting: STUDENT IN AN ORGANIZED HEALTH CARE EDUCATION/TRAINING PROGRAM
Payer: MEDICARE

## 2024-02-09 VITALS
RESPIRATION RATE: 18 BRPM | DIASTOLIC BLOOD PRESSURE: 78 MMHG | TEMPERATURE: 97 F | SYSTOLIC BLOOD PRESSURE: 123 MMHG | WEIGHT: 119.93 LBS | HEIGHT: 65 IN | HEART RATE: 83 BPM | OXYGEN SATURATION: 97 %

## 2024-02-09 DIAGNOSIS — Z98.82 BREAST IMPLANT STATUS: Chronic | ICD-10-CM

## 2024-02-09 DIAGNOSIS — N80.9 ENDOMETRIOSIS, UNSPECIFIED: Chronic | ICD-10-CM

## 2024-02-09 PROCEDURE — 99285 EMERGENCY DEPT VISIT HI MDM: CPT | Mod: 57

## 2024-02-09 PROCEDURE — 24650 CLTX RDL HEAD/NCK FX WO MNPJ: CPT | Mod: 54,RT

## 2024-02-09 NOTE — ED ADULT NURSE NOTE - OBJECTIVE STATEMENT
BIB ambulance for mechanical fall while roller skating. "I fell backwards and I think I landed on my wrist". c/o pain to top right wrist. Limited ROM, pain on palpation. (+) deformity, (+) sensation, (+) pulses, skin intact. Ice applied. X-Rays done. Splint applied by Dr. Miller. Pt tolerated procedure well. d/c'd home w/ f/u to ortho.

## 2024-02-09 NOTE — ED ADULT NURSE NOTE - NSFALLCONCLUSION_ED_ALL_ED
Patient daughter Radha Ogden called again and said that the patient has not even started to wean off the Gabapenin medication as yet. That Cathryn Smith will not let her start the Lyrica medication until the patient is completely weaned off the Gabapentin medication. Patient daughter said that the patient has not started the decrease yet. That she does not have anything to wean off of. That she needs the refill on the Gabapentin medication so that she can wean off it slowly. [address] [facility] in [place]. Tel. [phone] Veronica Natarajan tel. [phone]. [employer]

## 2024-02-09 NOTE — ED ADULT NURSE NOTE - LOCATION
Lab Results   Component Value Date    HGBA1C 7 1 (H) 06/21/2018   BS good at home    No results for input(s): POCGLU in the last 72 hours      Blood Sugar Average: Last 72 hrs: wrist

## 2024-02-09 NOTE — ED ADULT TRIAGE NOTE - NS ED NURSE AMBULANCES
Continue to offer 3 healthy meals and snacks throughout the day  Incorporate nutrient dense foods and healthy fats to increase calories and help to gain weight  Offer plenty of water  Follow-up with GI  Referral given    Call with other questions or concerns [facility]

## 2024-02-10 VITALS
DIASTOLIC BLOOD PRESSURE: 83 MMHG | OXYGEN SATURATION: 100 % | RESPIRATION RATE: 18 BRPM | HEART RATE: 80 BPM | SYSTOLIC BLOOD PRESSURE: 127 MMHG | TEMPERATURE: 98 F

## 2024-02-10 PROCEDURE — 73130 X-RAY EXAM OF HAND: CPT

## 2024-02-10 PROCEDURE — 29125 APPL SHORT ARM SPLINT STATIC: CPT | Mod: RT

## 2024-02-10 PROCEDURE — 73130 X-RAY EXAM OF HAND: CPT | Mod: 26,RT

## 2024-02-10 PROCEDURE — 73110 X-RAY EXAM OF WRIST: CPT | Mod: 26,RT

## 2024-02-10 PROCEDURE — 73090 X-RAY EXAM OF FOREARM: CPT

## 2024-02-10 PROCEDURE — 73090 X-RAY EXAM OF FOREARM: CPT | Mod: 26,LT

## 2024-02-10 PROCEDURE — 73110 X-RAY EXAM OF WRIST: CPT

## 2024-02-10 PROCEDURE — 99284 EMERGENCY DEPT VISIT MOD MDM: CPT | Mod: 25

## 2024-02-10 RX ORDER — OXYCODONE AND ACETAMINOPHEN 5; 325 MG/1; MG/1
1 TABLET ORAL
Qty: 16 | Refills: 0
Start: 2024-02-10 | End: 2024-02-13

## 2024-02-10 NOTE — ED PROVIDER NOTE - CARE PROVIDER_API CALL
Abdirahman Ferrari  Orthopaedic Surgery  [address], [address]  [place] [address]  Phone: [phone]  Fax: [phone]  Follow Up Time:     George Olivares  Internal Medicine  [address] [place] [id number]  Phone: [phone]  Fax: [phone]  Follow Up Time:

## 2024-02-10 NOTE — ED PROVIDER NOTE - CARE PROVIDERS DIRECT ADDRESSES
[email or web address]

## 2024-02-10 NOTE — ED PROVIDER NOTE - OBJECTIVE STATEMENT
49 year old female with a history of epilepsy, RA presents with right wrist pain. Patient BIBA, states she was roller skating at a roller rink and fell onto her buttocks.  She outstretched her right hand to break her fall.  No head trauma or LOC.  She denies any other injuries.  She did not take any pain medication PTA.  Patient is right-hand dominant.  She is currently declining any analgesia in the ED.  PMD Ricardo Olivares

## 2024-02-10 NOTE — ED PROVIDER NOTE - NSFOLLOWUPINSTRUCTIONS_ED_ALL_ED_FT
R Please take the medication as prescribed and follow up with orthopedics.  Do not drive or operate heavy machinery while taking Percocet.  Return to the ER for persistent pain, swelling, numbness, weakness, fever, or any other concerns.     Wrist Fracture Treated With Immobilization       A wrist fracture is a break or crack in one of the bones of the wrist. The wrist is made up of eight small bones at the palm of the hand (carpal bones) and two long bones that make up the forearm (radius and ulna).    If the joint is stable and the bones are still in their normal position (nondisplaced), the injury may be treated with immobilization. This involves the use of a cast, splint, or sling to hold the wrist in place. Immobilization ensures that the bones continue to stay in the correct position while the wrist is healing.    What are the causes?  This condition may be caused by:    A direct force to the wrist.  Falling on an outstretched hand.  Trauma, such as a car accident or a fall.    What increases the risk?  The following factors may make you more likely to develop this condition:    Doing contact sports or high-risk sports such as skiing, biking, and ice skating.  Taking steroid medicines.  Smoking.  Being female.  Being .  Drinking more than three alcoholic beverages per day.  Having a condition that weakens the bones (osteoporosis).  Being older.  Having a history of previous fractures.    What are the signs or symptoms?  Symptoms of this condition include:    Pain.  Swelling.  Bruising.  Not being able to move the wrist normally.    Additionally, the wrist may hang in an odd position or appear deformed.    How is this diagnosed?  This condition may be diagnosed based on a physical exam and X-rays. You may also have a CT scan or MRI.    How is this treated?  Treatment for this condition involves wearing a cast, splint, or sling until the injured area is stable enough for you to begin range-of-motion exercises. You may also be prescribed pain medicine.    Follow these instructions at home:      If you have a cast:    Do not stick anything inside the cast to scratch your skin. Doing that increases your risk of infection.  Check the skin around the cast every day. Tell your health care provider about any concerns.  You may put lotion on dry skin around the edges of the cast. Do not put lotion on the skin underneath the cast.  Keep the cast clean and dry.        If you have a splint or sling:    Wear the splint or sling as told by your health care provider. Remove it only as told by your health care provider.  Loosen the splint or sling if your fingers tingle, become numb, or turn cold and blue.  Keep the splint or sling clean and dry.        Bathing    Do not take baths, swim, or use a hot tub until your health care provider approves. Ask your health care provider if you may take showers. You may only be allowed to take sponge baths.  If your cast, splint, or sling is not waterproof:    Do not let it get wet.  Cover it with a watertight covering when you take a bath or a shower.  If you have a sling, remove it for bathing only if your health care provider tells you it is safe to do that.        Managing pain, stiffness, and swelling     If directed, put ice on the injured area.    If you have a removable splint or sling, remove it as told by your health care provider.  Put ice in a plastic bag.  Place a towel between your skin and the bag or between your cast and the bag.  Leave the ice on for 20 minutes, 2–3 times a day.  Move your fingers often to avoid stiffness and to lessen swelling.  Raise (elevate) the injured area above the level of your heart while you are sitting or lying down.        Driving    Do not drive or use heavy machinery while taking prescription pain medicine.  Ask your health care provider when it is safe to drive if you have a cast, splint, or sling on your wrist.        Activity    Return to your normal activities as told by your health care provider. Ask your health care provider what activities are safe for you.  Do not lift with your injured wrist until your health care provider approves.  Do range-of-motion exercises only as told by your health care provider or physical therapist.        General instructions    Do not put pressure on any part of the cast or splint until it is fully hardened. This may take several hours.  Take over-the-counter and prescription medicines only as told by your health care provider.  Do not use any products that contain nicotine or tobacco, such as cigarettes, e-cigarettes, and chewing tobacco. These can delay bone healing. If you need help quitting, ask your health care provider.  If you were prescribed pain medicine, take steps to prevent or treat constipation. Your health care provider may recommend that you:    Drink enough fluid to keep your urine pale yellow.  Take over-the-counter or prescription medicines.  Eat foods that are high in fiber, such as beans, whole grains, and fresh fruits and vegetables.  Limit foods that are high in fat and processed sugars, such as fried or sweet foods.  Keep all follow-up visits as told by your health care provider. This is important.    Contact a health care provider if:  Your cast, splint, or sling is damaged or loose.  You have any new pain, swelling, or bruising.  Your pain, swelling, and bruising do not improve.  You have a fever.  You have chills.    Get help right away if:  Your skin or fingers on your injured arm turn blue or gray.  Your arm feels cold or numb.  You have severe pain in your injured wrist.    Summary  A wrist fracture is a break or crack in one of the bones of the wrist.  If the joint is stable and the bones are still in their normal position, the injury may be treated by wearing a cast, splint, or sling.  If you have a cast, check the skin around the cast every day. Tell your health care provider about any concerns.  If you have a splint or sling, wear it as told by your health care provider. Remove it only as told by your health care provider.    ADDITIONAL NOTES AND INSTRUCTIONS    Please follow up with your Primary MD in 24-48 hr.  Seek immediate medical care for any new/worsening signs or symptoms.

## 2024-02-10 NOTE — ED PROVIDER NOTE - MUSCULOSKELETAL, MLM
Spine appears normal, range of motion is not limited.  Right wrist with swelling, ecchymosis, tenderness at distal radius.  Decreased ROM.  No bone protrusion,  Full ROM of right finger and elbows.  +Radial pulse.  Cap refill < 2 seconds

## 2024-02-10 NOTE — ED PROVIDER NOTE - PATIENT PORTAL LINK FT
You can access the FollowMyHealth Patient Portal offered by [facility] by registering at the following website: [email or web address]. By joining [employer]’s FollowMyHealth portal, you will also be able to view your health information using other applications (apps) compatible with our system.

## 2024-02-10 NOTE — ED PROCEDURE NOTE - NS ED PROCEDURE ASSISTED BY
----- Message from Steph Long DO sent at 12/3/2021  6:33 PM CST -----  Please let Arvind know,   I received the results of the recent lab work. Kidney function, liver function, electrolytes, blood counts, and prostate were all normal. Cholesterol is slightly high which he can improve through exercise and a diet of lean meats, fruits, and vegetables, limiting sugar, carbohydrates, and alcohol.       
Left message for pt to return call to clinic regarding below information  
Writer informed patient of results/recommendations.  Patient verbalized understanding with no further questions or concerns.    
The procedure was performed independently

## 2024-02-10 NOTE — ED PROVIDER NOTE - CLINICAL SUMMARY MEDICAL DECISION MAKING FREE TEXT BOX
49 year old female p/w right wrist pain and swelling s/p fall on outstretched hand at roller [facility].  Right wrist swollen and tender at distal radius.  X-ray, analgesia, splint, consult ortho if reduction indicated

## 2024-02-10 NOTE — ED PROVIDER NOTE - DIFFERENTIAL DIAGNOSIS
Differential Diagnosis Ddx includes but not limited to fracture, dislocation, sprain, tendon injury, ligamentous injury

## 2024-02-14 RX ORDER — DIVALPROEX SODIUM 500 MG/1
0 TABLET, DELAYED RELEASE ORAL
Refills: 0 | DISCHARGE

## 2024-08-05 NOTE — H&P PST ADULT - NS PRO FEM REPRO PAP RESULTS
Last OV: 6/21/2024  Next OV: 9/23/2024    Next appointment due:around 9/21/2024     Last fill:1/23/24  Refills:1#[id number]         normal

## 2024-10-18 NOTE — ED ADULT NURSE NOTE - NSFALLRSKHMRSKTYOTFT_ED_ALL_ED
Problem: Antepartum  Goal: Maintain pregnancy as long as maternal and/or fetal condition is stable  Outcome: Progressing  Goal: No decrease in circulation/VTE  Outcome: Progressing  Goal: FHR remains reassuring  Outcome: Progressing  Goal: Minimize anxiety/maximize coping  Outcome: Progressing     Problem: Hypertensive Disorder of Pregnancy (HDP)  Goal: Minimal s/sx of HDP and BP<160/110  Outcome: Progressing  Goal: Adequate urine output (0.5 ml/kg/hr)  Outcome: Progressing     Problem: Discharge Planning  Goal: Discharge to home or other facility with appropriate resources  Outcome: Progressing   The patient's goals for the shift include      The clinical goals for the shift include category I FHR    Patient on L&D for cEFM. VS stable, patient stable, FHR overall reassuring with moderate variability and accelerations. Patient to see MFM in the morning.    
seizure disorder

## 2025-04-29 ENCOUNTER — NON-APPOINTMENT (OUTPATIENT)
Age: 50
End: 2025-04-29

## 2025-04-30 ENCOUNTER — NON-APPOINTMENT (OUTPATIENT)
Age: 50
End: 2025-04-30

## 2025-05-01 ENCOUNTER — APPOINTMENT (OUTPATIENT)
Dept: SURGERY | Facility: CLINIC | Age: 50
End: 2025-05-01
Payer: MEDICARE

## 2025-05-01 VITALS
HEIGHT: 65 IN | OXYGEN SATURATION: 99 % | HEART RATE: 73 BPM | SYSTOLIC BLOOD PRESSURE: 110 MMHG | DIASTOLIC BLOOD PRESSURE: 79 MMHG | WEIGHT: 125 LBS | BODY MASS INDEX: 20.83 KG/M2

## 2025-05-01 PROCEDURE — 99203 OFFICE O/P NEW LOW 30 MIN: CPT

## 2025-05-06 ENCOUNTER — APPOINTMENT (OUTPATIENT)
Dept: SURGERY | Facility: CLINIC | Age: 50
End: 2025-05-06
Payer: MEDICARE

## 2025-05-06 DIAGNOSIS — R10.9 UNSPECIFIED ABDOMINAL PAIN: ICD-10-CM

## 2025-05-06 PROCEDURE — 99213 OFFICE O/P EST LOW 20 MIN: CPT

## 2025-05-12 ENCOUNTER — APPOINTMENT (OUTPATIENT)
Dept: OTOLARYNGOLOGY | Facility: CLINIC | Age: 50
End: 2025-05-12

## 2025-05-15 ENCOUNTER — APPOINTMENT (OUTPATIENT)
Dept: OTOLARYNGOLOGY | Facility: CLINIC | Age: 50
End: 2025-05-15
Payer: MEDICARE

## 2025-05-15 VITALS
BODY MASS INDEX: 20.66 KG/M2 | HEART RATE: 72 BPM | WEIGHT: 124 LBS | HEIGHT: 65 IN | DIASTOLIC BLOOD PRESSURE: 78 MMHG | SYSTOLIC BLOOD PRESSURE: 113 MMHG

## 2025-05-15 DIAGNOSIS — R06.02 SHORTNESS OF BREATH: ICD-10-CM

## 2025-05-15 DIAGNOSIS — R13.12 DYSPHAGIA, OROPHARYNGEAL PHASE: ICD-10-CM

## 2025-05-15 DIAGNOSIS — R09.89 OTHER SPECIFIED SYMPTOMS AND SIGNS INVOLVING THE CIRCULATORY AND RESPIRATORY SYSTEMS: ICD-10-CM

## 2025-05-15 DIAGNOSIS — G40.909 EPILEPSY, UNSPECIFIED, NOT INTRACTABLE, W/OUT STATUS EPILEPTICUS: ICD-10-CM

## 2025-05-15 DIAGNOSIS — R49.9 UNSPECIFIED VOICE AND RESONANCE DISORDER: ICD-10-CM

## 2025-05-15 DIAGNOSIS — J38.3 OTHER DISEASES OF VOCAL CORDS: ICD-10-CM

## 2025-05-15 PROCEDURE — 99203 OFFICE O/P NEW LOW 30 MIN: CPT | Mod: 25

## 2025-05-15 PROCEDURE — 31575 DIAGNOSTIC LARYNGOSCOPY: CPT

## 2025-06-16 ENCOUNTER — NON-APPOINTMENT (OUTPATIENT)
Age: 50
End: 2025-06-16

## 2025-06-17 ENCOUNTER — APPOINTMENT (OUTPATIENT)
Dept: OTOLARYNGOLOGY | Facility: CLINIC | Age: 50
End: 2025-06-17

## 2025-06-20 ENCOUNTER — APPOINTMENT (OUTPATIENT)
Dept: OTOLARYNGOLOGY | Facility: CLINIC | Age: 50
End: 2025-06-20

## 2025-06-30 ENCOUNTER — APPOINTMENT (OUTPATIENT)
Dept: GASTROENTEROLOGY | Facility: CLINIC | Age: 50
End: 2025-06-30